# Patient Record
Sex: FEMALE | Race: WHITE | NOT HISPANIC OR LATINO | Employment: FULL TIME | URBAN - NONMETROPOLITAN AREA
[De-identification: names, ages, dates, MRNs, and addresses within clinical notes are randomized per-mention and may not be internally consistent; named-entity substitution may affect disease eponyms.]

---

## 2018-04-09 ENCOUNTER — HOSPITAL ENCOUNTER (EMERGENCY)
Facility: HOSPITAL | Age: 28
Discharge: HOME | End: 2018-04-09
Attending: NURSE PRACTITIONER | Admitting: NURSE PRACTITIONER
Payer: COMMERCIAL

## 2018-04-09 VITALS
SYSTOLIC BLOOD PRESSURE: 134 MMHG | DIASTOLIC BLOOD PRESSURE: 60 MMHG | OXYGEN SATURATION: 98 % | TEMPERATURE: 98.1 F | RESPIRATION RATE: 16 BRPM | HEART RATE: 66 BPM

## 2018-04-09 DIAGNOSIS — M26.609 TMJ (TEMPOROMANDIBULAR JOINT SYNDROME): ICD-10-CM

## 2018-04-09 PROCEDURE — G0463 HOSPITAL OUTPT CLINIC VISIT: HCPCS

## 2018-04-09 PROCEDURE — 99203 OFFICE O/P NEW LOW 30 MIN: CPT | Performed by: NURSE PRACTITIONER

## 2018-04-09 RX ORDER — CYCLOBENZAPRINE HCL 10 MG
10 TABLET ORAL 3 TIMES DAILY PRN
Qty: 20 TABLET | Refills: 0 | Status: SHIPPED | OUTPATIENT
Start: 2018-04-09 | End: 2018-05-23

## 2018-04-09 RX ORDER — IBUPROFEN 800 MG/1
800 TABLET, FILM COATED ORAL EVERY 8 HOURS PRN
Qty: 30 TABLET | Refills: 0 | Status: SHIPPED | OUTPATIENT
Start: 2018-04-09 | End: 2018-04-17

## 2018-04-09 ASSESSMENT — ENCOUNTER SYMPTOMS
CHILLS: 0
SORE THROAT: 0
FATIGUE: 0
DIARRHEA: 0
FEVER: 0
NAUSEA: 0
TROUBLE SWALLOWING: 0
ACTIVITY CHANGE: 0
APPETITE CHANGE: 0
SINUS PAIN: 0
VOMITING: 0
WEAKNESS: 0
PSYCHIATRIC NEGATIVE: 1
COUGH: 0
SINUS PRESSURE: 0
ABDOMINAL PAIN: 0
DYSURIA: 0
FACIAL SWELLING: 0
RHINORRHEA: 0

## 2018-04-09 NOTE — DISCHARGE INSTRUCTIONS
Take tylenol and/ or ibuprofen for pain.   Take Flexeril as needed as directed for jaw pain. Do not drive or participate in activities that require alertness.   Apply a warm pack to the right side of your jaw for 20 minutes every 1-2 hours while awake. Protect skin.   Follow up with dentist as scheduled.   Follow up with PCP with any increase in symptoms or concerns.   Return to urgent care or emergency department with any increase in symptoms or concerns.

## 2018-04-09 NOTE — ED PROVIDER NOTES
History     Chief Complaint   Patient presents with     Jaw Pain     The history is provided by the patient. No  was used.     Yulissa Grande is a 27 year old female who presents with right sided jaw pain that started 6 months ago. She's taken ibuprofen with mild effectiveness. She's been under stress as her  passed away and has been a single mother since. This occurred in the past year. She doesn't think she grinds her teeth, but when she awakens in the am that is when the jaw pain is at the greatest. Denies fever, chills, or night sweats. Eating and drinking well. Bowel and bladder are working well. No antibiotic use in the past 30 days.     Problem List:    There are no active problems to display for this patient.       Past Medical History:    History reviewed. No pertinent past medical history.    Past Surgical History:    No past surgical history on file.    Family History:    No family history on file.    Social History:  Marital Status:  Single [1]  Social History   Substance Use Topics     Smoking status: Not on file     Smokeless tobacco: Not on file     Alcohol use Not on file        Medications:      cyclobenzaprine (FLEXERIL) 10 MG tablet   ibuprofen (ADVIL/MOTRIN) 800 MG tablet         Review of Systems   Constitutional: Negative for activity change, appetite change, chills, fatigue and fever.   HENT: Negative for congestion, dental problem, ear discharge, ear pain, facial swelling, mouth sores, postnasal drip, rhinorrhea, sinus pain, sinus pressure, sore throat and trouble swallowing.         Right sided jaw pain with radiation to right temple and right ear.    Respiratory: Negative for cough.    Cardiovascular: Negative for chest pain.   Gastrointestinal: Negative for abdominal pain, diarrhea, nausea and vomiting.   Genitourinary: Negative for dysuria.   Skin: Negative for rash.   Neurological: Negative for dizziness, weakness and headaches.   Psychiatric/Behavioral:  Negative.        Physical Exam   BP: 134/60  Pulse: 66  Temp: 98.1  F (36.7  C)  Resp: 16  SpO2: 98 %      Physical Exam   Constitutional: She is oriented to person, place, and time. She appears well-developed and well-nourished. No distress.   HENT:   Head: Normocephalic.   Right Ear: External ear normal.   Left Ear: External ear normal.   Mouth/Throat: Oropharynx is clear and moist. No oropharyngeal exudate.   Pain is reproducible to right TMJ. No dental pain on mouth exam.    Neck: Normal range of motion. Neck supple.   Cardiovascular: Normal rate, regular rhythm and normal heart sounds.    No murmur heard.  Pulmonary/Chest: Effort normal. No respiratory distress. She has no wheezes. She has no rales.   Abdominal: Soft. She exhibits no distension.   Musculoskeletal: Normal range of motion.   Lymphadenopathy:     She has no cervical adenopathy.   Neurological: She is alert and oriented to person, place, and time. She exhibits normal muscle tone.   Skin: Skin is warm and dry. No rash noted. She is not diaphoretic.   Psychiatric: She has a normal mood and affect. Her behavior is normal.   Nursing note and vitals reviewed.      ED Course     ED Course     Procedures      Assessments & Plan (with Medical Decision Making)     Symptoms and exam are consistent with TMJ. She scheduled a dentist appointment for this week.     Discussed plan of care. She verbalized understanding. All questions answered.     I have reviewed the nursing notes.    I have reviewed the findings, diagnosis, plan and need for follow up with the patient.  Discharged in stable condition.     New Prescriptions    CYCLOBENZAPRINE (FLEXERIL) 10 MG TABLET    Take 1 tablet (10 mg) by mouth 3 times daily as needed for muscle spasms    IBUPROFEN (ADVIL/MOTRIN) 800 MG TABLET    Take 1 tablet (800 mg) by mouth every 8 hours as needed for moderate pain       Final diagnoses:   TMJ (temporomandibular joint syndrome)     Take tylenol and/ or ibuprofen for pain.    Take Flexeril as needed as directed for jaw pain. Do not drive or participate in activities that require alertness.   Apply a warm pack to the right side of your jaw for 20 minutes every 1-2 hours while awake. Protect skin.   Follow up with dentist as scheduled.   Follow up with PCP with any increase in symptoms or concerns.   Return to urgent care or emergency department with any increase in symptoms or concerns.     YVETTE Cunningham  4/9/2018  12:51 PM  URGENT CARE CLINIC       Rocio Chi NP  04/10/18 1228

## 2018-04-09 NOTE — ED AVS SNAPSHOT
[facility]    [address] [address]    Phone:  [phone]                                       Yulissa Grande   MRN: [id number]    Department:  [facility]   Date of Visit:  4/9/2018           After Visit Summary Signature Page     I have received my discharge instructions, and my questions have been answered. I have discussed any challenges I see with this plan with the nurse or doctor.    ..........................................................................................................................................  Patient/Patient Representative Signature      ..........................................................................................................................................  Patient Representative Print Name and Relationship to Patient    ..................................................               ................................................  Date                                            Time    ..........................................................................................................................................  Reviewed by Signature/Title    ...................................................              ..............................................  Date                                                            Time

## 2018-04-09 NOTE — ED AVS SNAPSHOT
[facility]    [address] [address]    Phone:  [phone]                                       Yulissa Grande   MRN: [id number]    Department:  [facility]   Date of Visit:  4/9/2018           Patient Information     Date Of Birth          1990        Your diagnoses for this visit were:     TMJ (temporomandibular joint syndrome)        You were seen by Rocio Chi NP.      Follow-up Information     Follow up with [facility].    Specialty:  EMERGENCY MEDICINE    Why:  As needed, If symptoms worsen    Contact information:    [address] [place] [address] [phone]    Additional information:    From [facility]: Take [address]. Turn left at [address]. Turn left at the first stoplight on [address]. At the first stop sign, take a right onto [address]. Take a left into the parking lot and continue through until you reach the North enterance of the building.       From [place]: Take [address]. Take the [address] ramp towards [place]. Turn left onto [address]. Take a slight right onto [address]. Turn left at the first stoplight on [address]. At the first stop sign, take a right onto [address]. Take a left into the parking lot and continue through until you reach the North enterance of the building.       From [place]: Take [address]. Take a right at [address]. At the first stop sign, take a right onto [address]. Take a left into the parking lot and continue through until you reach the North enterance of the building.         Discharge Instructions       Take tylenol and/ or ibuprofen for pain.   Take Flexeril as needed as directed for jaw pain. Do not drive or participate in activities that require alertness.   Apply a warm pack to the right side of your jaw for 20 minutes every 1-2 hours while awake. Protect skin.   Follow up  with dentist as scheduled.   Follow up with PCP with any increase in symptoms or concerns.   Return to urgent care or emergency department with any increase in symptoms or concerns.     Discharge References/Attachments     TMJ SYNDROME (ENGLISH)      Your next 10 appointments already scheduled     Apr 12, 2018 10:15 AM CDT   (Arrive by 10:00 AM)   Office Visit with Olivia Jj MD   [facility] ([facility] )    [address]  [facility] [id number] [phone]           Bring a current list of meds and any records pertaining to this visit.  For Physicals, please bring immunization records and any forms needing to be filled out.  Please arrive 15 minutes early to complete paperwork and register.                 Review of your medicines      START taking        Dose / Directions Last dose taken    cyclobenzaprine 10 MG tablet   Commonly known as:  FLEXERIL   Dose:  10 mg   Quantity:  20 tablet        Take 1 tablet (10 mg) by mouth 3 times daily as needed for muscle spasms   Refills:  0        ibuprofen 800 MG tablet   Commonly known as:  ADVIL/MOTRIN   Dose:  800 mg   Quantity:  30 tablet        Take 1 tablet (800 mg) by mouth every 8 hours as needed for moderate pain   Refills:  0                Prescriptions were sent or printed at these locations (2 Prescriptions)                   [id number] [place] [facility] [address] [address]   [address] [facility] [address], [place] [address]    Telephone:  [phone]   Fax:  [phone]   Hours:                  E-Prescribed (2 of 2)         cyclobenzaprine (FLEXERIL) 10 MG tablet               ibuprofen (ADVIL/MOTRIN) 800 MG tablet                Orders Needing Specimen Collection     None      Pending Results     No orders found from 4/7/2018 to 4/10/2018.            Pending Culture Results     No orders found from 4/7/2018 to 4/10/2018.            Thank you for choosing [facility]       Thank you for choosing [facility] for your care. Our goal is  "always to provide you with excellent care. Hearing back from our patients is one way we can continue to improve our services. Please take a few minutes to complete the written survey that you may receive in the mail after you visit with us. Thank you!        [employer] Information     [employer] lets you send messages to your doctor, view your test results, renew your prescriptions, schedule appointments and more. To sign up, go to [email or web address].[email or web address] . Click on \"Log in\" on the left side of the screen, which will take you to the Welcome page. Then click on \"Sign up Now\" on the right side of the page.     You will be asked to enter the access code listed below, as well as some personal information. Please follow the directions to create your username and password.     Your access code is: [email or web address]  Expires: 2018  1:28 PM     Your access code will  in 90 days. If you need help or a new code, please call your [place] clinic or [phone].        Care EveryWhere ID     This is your Care EveryWhere ID. This could be used by other organizations to access your [place] medical records  [id number]        Equal Access to Services     BETTE MEADOWS : Hadii luis Banks, wayolandada derek, qapepe kaalmada yarely, justin hansen. So [facility] [phone].    ATENCIÓN: Si habla español, tiene a esqueda disposición servicios gratuitos de asistencia lingüística. Alessia al [phone].    We comply with applicable federal civil rights laws and [place] laws. We do not discriminate on the basis of race, color, national origin, age, disability, sex, sexual orientation, or gender identity.            After Visit Summary       This is your record. Keep this with you and show to your community pharmacist(s) and doctor(s) at your next visit.                  "

## 2018-04-09 NOTE — ED NOTES
Pt presents today alone for c/o right sided facial pain that starts in her cheek goes up into her temple, behind her eye and then down into her neck.

## 2018-04-10 ASSESSMENT — ENCOUNTER SYMPTOMS
DIZZINESS: 0
HEADACHES: 0

## 2018-04-12 ENCOUNTER — OFFICE VISIT (OUTPATIENT)
Dept: FAMILY MEDICINE | Facility: OTHER | Age: 28
End: 2018-04-12
Attending: FAMILY MEDICINE
Payer: COMMERCIAL

## 2018-04-12 VITALS
HEIGHT: 64 IN | HEART RATE: 79 BPM | RESPIRATION RATE: 18 BRPM | DIASTOLIC BLOOD PRESSURE: 78 MMHG | BODY MASS INDEX: 34.15 KG/M2 | SYSTOLIC BLOOD PRESSURE: 120 MMHG | TEMPERATURE: 99.1 F | WEIGHT: 200 LBS | OXYGEN SATURATION: 98 %

## 2018-04-12 DIAGNOSIS — F43.21 GRIEF REACTION: ICD-10-CM

## 2018-04-12 DIAGNOSIS — Z76.89 ESTABLISHING CARE WITH NEW DOCTOR, ENCOUNTER FOR: Primary | ICD-10-CM

## 2018-04-12 PROCEDURE — G0463 HOSPITAL OUTPT CLINIC VISIT: HCPCS

## 2018-04-12 PROCEDURE — 99203 OFFICE O/P NEW LOW 30 MIN: CPT | Performed by: FAMILY MEDICINE

## 2018-04-12 ASSESSMENT — ANXIETY QUESTIONNAIRES
5. BEING SO RESTLESS THAT IT IS HARD TO SIT STILL: NOT AT ALL
3. WORRYING TOO MUCH ABOUT DIFFERENT THINGS: NOT AT ALL
4. TROUBLE RELAXING: NOT AT ALL
1. FEELING NERVOUS, ANXIOUS, OR ON EDGE: NOT AT ALL
7. FEELING AFRAID AS IF SOMETHING AWFUL MIGHT HAPPEN: NOT AT ALL
2. NOT BEING ABLE TO STOP OR CONTROL WORRYING: NOT AT ALL
GAD7 TOTAL SCORE: 0
6. BECOMING EASILY ANNOYED OR IRRITABLE: NOT AT ALL

## 2018-04-12 ASSESSMENT — PAIN SCALES - GENERAL: PAINLEVEL: NO PAIN (0)

## 2018-04-12 NOTE — NURSING NOTE
"Chief Complaint   Patient presents with     Establish Care       Initial /78 (BP Location: Right arm, Patient Position: Chair, Cuff Size: Adult Regular)  Pulse 79  Temp 99.1  F (37.3  C) (Tympanic)  Resp 18  Ht 5' 4\" (1.626 m)  Wt 200 lb (90.7 kg)  SpO2 98%  BMI 34.33 kg/m2 Estimated body mass index is 34.33 kg/(m^2) as calculated from the following:    Height as of this encounter: 5' 4\" (1.626 m).    Weight as of this encounter: 200 lb (90.7 kg).  Medication Reconciliation: complete   Abril Henry LPN      "

## 2018-04-12 NOTE — PROGRESS NOTES
SUBJECTIVE:   Yulissa Grande is a 27 year old female who presents to clinic today for the following health issues:    Establish Care     Amount of exercise or physical activity: 6-7 days/week for an average of greater than 60 minutes    Problems taking medications regularly: No    Medication side effects: none    Diet: regular (no restrictions)    Relocated back to [place] from [place].  Ajith passed away in an accident in July.  Step monica passed away from MI in 2017.  Has daughters, Gini and Valerie, ages 4 and 2.  Ajith was their father.  Patient did some counseling in [place].  Declines any further at this time.  Has support with family and friends.  Is joining [employer] in [place].  Denies substance use.  Prefers to avoid medication.      .  Uncomplicated pregnancies.  Last pap in [place] was abnormal.  Was suppose to have colposcopy, but then relocated.      Denies other health care problems.  Has had a cold past few days - cough and nasal congestion.        Problem list and histories reviewed & adjusted, as indicated.  Additional history: as documented    Current Outpatient Prescriptions   Medication     cyclobenzaprine (FLEXERIL) 10 MG tablet     ibuprofen (ADVIL/MOTRIN) 800 MG tablet     No current facility-administered medications for this visit.        There is no problem list on file for this patient.    History reviewed. No pertinent surgical history.    Social History   Substance Use Topics     Smoking status: Never Smoker     Smokeless tobacco: Never Used     Alcohol use Yes      Comment: occasionally     Family History   Problem Relation Age of Onset     Other Cancer Mother      Chronic Obstructive Pulmonary Disease Mother      Hypertension Mother      Asthma Mother      Attention Deficit Disorder Mother      Irritable Bowel Syndrome Mother      Other Cancer Father      Hypertension Father      Alcoholism Father      Thyroid Disease Maternal Grandmother      Coronary Artery Disease Maternal  "Grandfather      Prostate Cancer Maternal Grandfather            Reviewed and updated as needed this visit by clinical staff  Tobacco  Allergies  Meds  Problems  Med Hx  Surg Hx  Fam Hx  Soc Hx        Reviewed and updated as needed this visit by Provider         ROS:  Constitutional, HEENT, cardiovascular, pulmonary, gi and gu systems are negative, except as otherwise noted.    OBJECTIVE:     /78 (BP Location: Right arm, Patient Position: Chair, Cuff Size: Adult Regular)  Pulse 79  Temp 99.1  F (37.3  C) (Tympanic)  Resp 18  Ht 5' 4\" (1.626 m)  Wt 200 lb (90.7 kg)  SpO2 98%  BMI 34.33 kg/m2  Body mass index is 34.33 kg/(m^2).  GENERAL: healthy, alert and no distress  NECK: no adenopathy, no asymmetry, masses, or scars and thyroid normal to palpation  RESP: lungs clear to auscultation - no rales, rhonchi or wheezes  CV: regular rate and rhythm, normal S1 S2, no S3 or S4, no murmur, click or rub, no peripheral edema and peripheral pulses strong  ABDOMEN: soft, nontender, no hepatosplenomegaly, no masses and bowel sounds normal  MS: no gross musculoskeletal defects noted, no edema  PSYCH: mentation appears normal, affect normal/bright      ASSESSMENT/PLAN:     (Z76.89) Establishing care with new doctor, encounter for  (primary encounter diagnosis)    (F43.20) Grief reaction  Comment: support given; counseling encouraged; medication options discussed for depression if worsening.    Patient Instructions   Sign release to get records transferred.  Will then review and set up with GYN for colposcopy if needed.  See list of local counseling services below.  Call if needing assistance setting up.    Psychologists/ counselors  Marly Kelley  [phone]  [facility]     [phone]  [facility]  [phone]  [facility] [phone]  [facility]     [phone]   [facility]  [phone]  (kids)  [facility] [employer] [facility] [phone]  (teens)  [facility]   [phone]  Counseling  [facility] [phone]  [facility] [phone]  [facility] [phone]  [facility]      [phone]  [facility] [phone]  [facility]  [phone]  [facility]     [phone]   [facility] counseling [phone]  [facility]  [phone]  [facility] [phone]  Lori Davison [phone]  [facility]     [phone]  [facility] Well[facility]     [phone]  [facility]      [phone]  [facility] [phone]  [place]  Alex Almeida  [phone]  [facility]     [phone]  [facility] Dr[address] LM Mcelroy     [phone]  [facility] Psychological Services     [phone]  [facility] [phone]                        Olivia So MD  [facility]

## 2018-04-12 NOTE — MR AVS SNAPSHOT
"              After Visit Summary   4/12/2018    Yulissa Grande    MRN: [id number]           Patient Information     Date Of Birth          1990        Visit Information        Provider Department      4/12/2018 10:15 AM Olivia Jj MD [facility]        Today's Diagnoses     Establishing care with new doctor, encounter for    -  1    Grief reaction          Care Instructions    Sign release to get records transferred.  Will then review and set up with GYN for colposcopy if needed.  See list of local counseling services below.  Call if needing assistance setting up.            Follow-ups after your visit        Who to contact     If you have questions or need follow up information about today's clinic visit or your schedule please contact [facility] directly at [phone].  Normal or non-critical lab and imaging results will be communicated to you by classmarketshart, letter or phone within 4 business days after the clinic has received the results. If you do not hear from us within 7 days, please contact the clinic through [employer]art or phone. If you have a critical or abnormal lab result, we will notify you by phone as soon as possible.  Submit refill requests through [employer] or call your pharmacy and they will forward the refill request to us. Please allow 3 business days for your refill to be completed.          Additional Information About Your Visit        [email or web address] Information     [employer] lets you send messages to your doctor, view your test results, renew your prescriptions, schedule appointments and more. To sign up, go to [email or web address] . Click on \"Log in\" on the left side of the screen, which will take you to the Welcome page. Then click on \"Sign up Now\" on the right side of the page.     You will be asked to enter the access code listed below, as well as some personal information. Please follow the directions to create your username and password.     Your access code " "is: [email or web address]  Expires: 2018  1:28 PM     Your access code will  in 90 days. If you need help or a new code, please call your [place] clinic or [phone].        Care EveryWhere ID     This is your Care EveryWhere ID. This could be used by other organizations to access your [place] medical records  [id number]        Your Vitals Were     Pulse Temperature Respirations Height Pulse Oximetry BMI (Body Mass Index)    79 99.1  F (37.3  C) (Tympanic) 18 5' 4\" (1.626 m) 98% 34.33 kg/m2       Blood Pressure from Last 3 Encounters:   18 120/78   18 134/60    Weight from Last 3 Encounters:   18 200 lb (90.7 kg)              Today, you had the following     No orders found for display       Primary Care Provider Fax #    Physician No Ref-Primary [phone]       No address on file        Equal Access to Services     ANNELISE MEADOWS : Hadii luis hale hadasho Soomaali, waaxda luqadaha, qaybta kaalmada adeegyada, justin mottin duncan corcoran . So [facility] [phone].    ATENCIÓN: Si habla español, tiene a esqueda disposición servicios gratuitos de asistencia lingüística. Llame al [phone].    We comply with applicable federal civil rights laws and [place] laws. We do not discriminate on the basis of race, color, national origin, age, disability, sex, sexual orientation, or gender identity.            Thank you!     Thank you for choosing [facility] [place]  for your care. Our goal is always to provide you with excellent care. Hearing back from our patients is one way we can continue to improve our services. Please take a few minutes to complete the written survey that you may receive in the mail after your visit with us. Thank you!             Your Updated Medication List - Protect others around you: Learn how to safely use, store and throw away your medicines at www.disposemymeds.org.          This list is accurate as of 18 10:38 AM.  Always use your most recent med list.    "                Brand Name Dispense Instructions for use Diagnosis    cyclobenzaprine 10 MG tablet    FLEXERIL    20 tablet    Take 1 tablet (10 mg) by mouth 3 times daily as needed for muscle spasms        ibuprofen 800 MG tablet    ADVIL/MOTRIN    30 tablet    Take 1 tablet (800 mg) by mouth every 8 hours as needed for moderate pain

## 2018-04-12 NOTE — PATIENT INSTRUCTIONS
Sign release to get records transferred.  Will then review and set up with GYN for colposcopy if needed.  See list of local counseling services below.  Call if needing assistance setting up.    Psychologists/ counselors  [facility]  [phone]  [facility]     [phone]  Carlyle Chows  [phone]  [facility] [phone]  [facility]     [phone]   [facility]  [phone]  (kids)  [facility] [phone]  (teens)  [facility]   [phone]  Counseling  [facility] [phone]  [facility] [phone]  [facility] [phone]  [facility]      [phone]  [facility] [phone]  [facility]  [phone]  [facility]     [phone]   [facility] [phone]  [facility]  [phone]  [facility] [phone]  Lori Davison [phone]  [facility]     [phone]  [facility] Wellness     [phone]  [facility]      [phone]  [facility] [phone]  [place]  Alex Almeida  [phone]  [facility]     [phone]  [facility] [address] LM Mcel[facility]     [phone]  [facility] Psychological Services     [phone]  [facility] [phone]

## 2018-04-13 ASSESSMENT — ANXIETY QUESTIONNAIRES: GAD7 TOTAL SCORE: 0

## 2018-04-13 ASSESSMENT — PATIENT HEALTH QUESTIONNAIRE - PHQ9: SUM OF ALL RESPONSES TO PHQ QUESTIONS 1-9: 14

## 2018-04-15 ENCOUNTER — HEALTH MAINTENANCE LETTER (OUTPATIENT)
Age: 28
End: 2018-04-15

## 2018-04-16 ENCOUNTER — TELEPHONE (OUTPATIENT)
Dept: FAMILY MEDICINE | Facility: OTHER | Age: 28
End: 2018-04-16

## 2018-04-16 NOTE — TELEPHONE ENCOUNTER
Faxed POP to [facility] at [phone]    Faxed POP to [facility] at [phone]

## 2018-05-23 ENCOUNTER — OFFICE VISIT (OUTPATIENT)
Dept: FAMILY MEDICINE | Facility: OTHER | Age: 28
End: 2018-05-23
Attending: FAMILY MEDICINE
Payer: COMMERCIAL

## 2018-05-23 VITALS
BODY MASS INDEX: 34.15 KG/M2 | OXYGEN SATURATION: 98 % | DIASTOLIC BLOOD PRESSURE: 64 MMHG | WEIGHT: 200 LBS | HEIGHT: 64 IN | RESPIRATION RATE: 16 BRPM | SYSTOLIC BLOOD PRESSURE: 114 MMHG | HEART RATE: 70 BPM | TEMPERATURE: 98.8 F

## 2018-05-23 DIAGNOSIS — G89.29 CHRONIC PAIN OF RIGHT KNEE: ICD-10-CM

## 2018-05-23 DIAGNOSIS — Z30.013 ENCOUNTER FOR INITIAL PRESCRIPTION OF INJECTABLE CONTRACEPTIVE: Primary | ICD-10-CM

## 2018-05-23 DIAGNOSIS — M25.561 CHRONIC PAIN OF RIGHT KNEE: ICD-10-CM

## 2018-05-23 DIAGNOSIS — M25.571 PAIN IN JOINT, ANKLE AND FOOT, RIGHT: ICD-10-CM

## 2018-05-23 LAB — HCG UR QL: NEGATIVE

## 2018-05-23 PROCEDURE — G0463 HOSPITAL OUTPT CLINIC VISIT: HCPCS | Mod: 25

## 2018-05-23 PROCEDURE — 81025 URINE PREGNANCY TEST: CPT | Mod: ZL | Performed by: FAMILY MEDICINE

## 2018-05-23 PROCEDURE — G0463 HOSPITAL OUTPT CLINIC VISIT: HCPCS

## 2018-05-23 PROCEDURE — 96372 THER/PROPH/DIAG INJ SC/IM: CPT

## 2018-05-23 PROCEDURE — 99214 OFFICE O/P EST MOD 30 MIN: CPT | Performed by: FAMILY MEDICINE

## 2018-05-23 RX ORDER — MEDROXYPROGESTERONE ACETATE 150 MG/ML
150 INJECTION, SUSPENSION INTRAMUSCULAR
Qty: 1 ML | Refills: 3 | OUTPATIENT
Start: 2018-05-23 | End: 2023-04-20

## 2018-05-23 RX ORDER — IBUPROFEN 800 MG/1
TABLET, FILM COATED ORAL
COMMUNITY
Start: 2018-05-21 | End: 2018-07-21

## 2018-05-23 RX ORDER — HYDROCODONE BITARTRATE AND ACETAMINOPHEN 5; 325 MG/1; MG/1
TABLET ORAL
COMMUNITY
Start: 2018-05-21 | End: 2018-07-21

## 2018-05-23 ASSESSMENT — ANXIETY QUESTIONNAIRES
3. WORRYING TOO MUCH ABOUT DIFFERENT THINGS: NOT AT ALL
GAD7 TOTAL SCORE: 0
5. BEING SO RESTLESS THAT IT IS HARD TO SIT STILL: NOT AT ALL
4. TROUBLE RELAXING: NOT AT ALL
7. FEELING AFRAID AS IF SOMETHING AWFUL MIGHT HAPPEN: NOT AT ALL
2. NOT BEING ABLE TO STOP OR CONTROL WORRYING: NOT AT ALL
1. FEELING NERVOUS, ANXIOUS, OR ON EDGE: NOT AT ALL
6. BECOMING EASILY ANNOYED OR IRRITABLE: NOT AT ALL

## 2018-05-23 ASSESSMENT — PAIN SCALES - GENERAL: PAINLEVEL: NO PAIN (0)

## 2018-05-23 NOTE — PROGRESS NOTES
SUBJECTIVE:                                                    Yulissa Grande is a 28 year old female who presents to clinic today for the following health issues:        Birth Control      Duration: Is currently on the pill but can't remember to take them.      Description (location/character/radiation): Would like to know and learn the difference between the depo shot and the pills.    Intensity:  mild    Accompanying signs and symptoms: n/a    History (similar episodes/previous evaluation): None    Precipitating or alleviating factors: None    Therapies tried and outcome: None    Menses regular, normal flow    Forgets OCP majority of time, so has been off now for few months    Declines std testing         Musculoskeletal problem/pain      Duration: years    Description  Location: Right knee and ankle    Intensity:  moderate    Accompanying signs and symptoms: none    History  Previous similar problem: YES  Previous evaluation:  x-ray and MRI    Precipitating or alleviating factors:  Trauma or overuse: YES- fell playing basketball in school  Aggravating factors include: running-right ankle likes to turn inward    Therapies tried and outcome: Did see ortho but chose to not do any surgeries.  Is looking for knee brace, ankle brace and possibly ortho referral.    Injury was as a teen; issue with patellar tracking    Pain only bothers her with running and is trying to be active    Wants to try brace first before moving on to ortho consult again    No locking, catching, swelling      Problem list and histories reviewed & adjusted, as indicated.  Additional history: as documented    Current Outpatient Prescriptions   Medication     HYDROcodone-acetaminophen (NORCO) 5-325 MG per tablet     ibuprofen (ADVIL/MOTRIN) 800 MG tablet     medroxyPROGESTERone (DEPO-PROVERA) 150 MG/ML injection     order for DME     No current facility-administered medications for this visit.        There is no problem list on file for this  "patient.    History reviewed. No pertinent surgical history.    Social History   Substance Use Topics     Smoking status: Never Smoker     Smokeless tobacco: Never Used     Alcohol use Yes      Comment: occasionally     Family History   Problem Relation Age of Onset     Other Cancer Mother      Chronic Obstructive Pulmonary Disease Mother      Hypertension Mother      Asthma Mother      Attention Deficit Disorder Mother      Irritable Bowel Syndrome Mother      Other Cancer Father      Hypertension Father      Alcoholism Father      Thyroid Disease Maternal Grandmother      Coronary Artery Disease Maternal Grandfather      Prostate Cancer Maternal Grandfather            ROS:  CONSTITUTIONAL:NEGATIVE for fever, chills, change in weight  INTEGUMENTARY/SKIN: NEGATIVE for worrisome rashes, moles or lesions  RESP:NEGATIVE for significant cough or SOB  CV: NEGATIVE for chest pain, palpitations or peripheral edema  GI: NEGATIVE for nausea, abdominal pain, heartburn, or change in bowel habits  : normal menstrual cycles  MUSCULOSKELETAL: POSITIVE  for arthralgias right knee and ankle  NEURO: NEGATIVE for weakness, dizziness or paresthesias    OBJECTIVE:     /64 (BP Location: Left arm, Patient Position: Sitting, Cuff Size: Adult Large)  Pulse 70  Temp 98.8  F (37.1  C) (Tympanic)  Resp 16  Ht 5' 4\" (1.626 m)  Wt 200 lb (90.7 kg)  LMP 05/01/2018  SpO2 98%  BMI 34.33 kg/m2  Body mass index is 34.33 kg/(m^2).  GENERAL: healthy, alert and no distress  NECK: no adenopathy, no asymmetry, masses, or scars and thyroid normal to palpation  RESP: lungs clear to auscultation - no rales, rhonchi or wheezes  CV: regular rate and rhythm, normal S1 S2, no S3 or S4, no murmur, click or rub, no peripheral edema and peripheral pulses strong  ABDOMEN: soft, nontender, no hepatosplenomegaly, no masses and bowel sounds normal  MS: normal muscle tone, normal range of motion and both ankle and knee appear stable; no effusion; mild " generalized tenderness; patella visually seems to track  SKIN: no suspicious lesions or rashes  NEURO: Normal strength and tone, mentation intact and speech normal  PSYCH: mentation appears normal, affect normal/bright    Diagnostic Test Results:  Results for orders placed or performed in visit on 05/23/18 (from the past 24 hour(s))   HCG Qual, Urine - CSC,  Range, Honorio  ([id number])   Result Value Ref Range    HCG Qual Urine Negative NEG^Negative       ASSESSMENT/PLAN:   (Z30.013) Encounter for initial prescription of injectable contraceptive  (primary encounter diagnosis)  Comment: all contraceptive options discussed  Plan: HCG Qual, Urine - CSC,  Range, [place]          ([id number]), medroxyPROGESTERone (DEPO-PROVERA)         150 MG/ML injection, C Medroxyprogesterone         inj/1mg, INJECTION INTRAMUSCULAR OR SUB-Q          (M25.561,  G89.29) Chronic pain of right knee  Comment: prior patellar abnormal tracking; injury  Plan: order for DME          (M25.571) Pain in joint, ankle and foot, right  Comment: secondary  Plan: order for DME            Patient Instructions   Urine pregnancy test negative today.  Start Depo Provera.  Schedule follow up visit in shot room for next injection.  Condom use advised along with routine std testing.    Script for knee and ankle brace sent to [facility].  Pulling old chart to review ortho and MRI records.  Call if desiring repeat orthopedic consultation.  May need updated imaging.            Olivia So MD  [facility]

## 2018-05-23 NOTE — NURSING NOTE
"Chief Complaint   Patient presents with     Contraception       Initial /64 (BP Location: Left arm, Patient Position: Sitting, Cuff Size: Adult Large)  Pulse 70  Temp 98.8  F (37.1  C) (Tympanic)  Resp 16  Ht 5' 4\" (1.626 m)  Wt 200 lb (90.7 kg)  LMP 05/01/2018  SpO2 98%  BMI 34.33 kg/m2 Estimated body mass index is 34.33 kg/(m^2) as calculated from the following:    Height as of this encounter: 5' 4\" (1.626 m).    Weight as of this encounter: 200 lb (90.7 kg).  Medication Reconciliation: complete       Annie Woods LPN    "

## 2018-05-23 NOTE — MR AVS SNAPSHOT
After Visit Summary   5/23/2018    Yulissa Grande    MRN: [id number]           Patient Information     Date Of Birth          1990        Visit Information        Provider Department      5/23/2018 1:45 PM [facility] Jj MD [facility]        Today's Diagnoses     Encounter for initial prescription of injectable contraceptive    -  1    Chronic pain of right knee        Pain in joint, ankle and foot, right          Care Instructions    Urine pregnancy test negative today.  Start Depo Provera.  Schedule follow up visit in shot room for next injection.  Condom use advised along with routine std testing.    Script for knee and ankle brace sent to [facility].  Pulling old chart to review ortho and MRI records.  Call if desiring repeat orthopedic consultation.  May need updated imaging.              Follow-ups after your visit        Future tests that were ordered for you today     Open Standing Orders        Priority Remaining Interval Expires Ordered    C Medroxyprogesterone inj/1mg Routine 4/4 5/23/2019 5/23/2018    INJECTION INTRAMUSCULAR OR SUB-Q Routine 4/4 5/23/2019 5/23/2018            Who to contact     If you have questions or need follow up information about today's clinic visit or your schedule please contact [facility] directly at [phone].  Normal or non-critical lab and imaging results will be communicated to you by MyChart, letter or phone within 4 business days after the clinic has received the results. If you do not hear from us within 7 days, please contact the clinic through MyChart or phone. If you have a critical or abnormal lab result, we will notify you by phone as soon as possible.  Submit refill requests through [employer] or call your pharmacy and they will forward the refill request to us. Please allow 3 business days for your refill to be completed.          Additional Information About Your Visit        Care EveryWhere ID     This is your  "Care EveryWhere ID. This could be used by other organizations to access your [employer] medical records  [id number]        Your Vitals Were     Pulse Temperature Respirations Height Last Period Pulse Oximetry    70 98.8  F (37.1  C) (Tympanic) 16 5' 4\" (1.626 m) 05/01/2018 98%    BMI (Body Mass Index)                   34.33 kg/m2            Blood Pressure from Last 3 Encounters:   05/23/18 114/64   04/12/18 120/78   04/09/18 134/60    Weight from Last 3 Encounters:   05/23/18 200 lb (90.7 kg)   04/12/18 200 lb (90.7 kg)              We Performed the Following     HCG Qual, Urine - ALEJANDRO,  Honorio Hardy  ([id number])          Today's Medication Changes          These changes are accurate as of 5/23/18  2:23 PM.  If you have any questions, ask your nurse or doctor.               Start taking these medicines.        Dose/Directions    medroxyPROGESTERone 150 MG/ML injection   Commonly known as:  DEPO-PROVERA   Used for:  Encounter for initial prescription of injectable contraceptive   Started by:  Olivia Jj MD        Dose:  150 mg   Inject 1 mL (150 mg) into the muscle every 3 months   Quantity:  1 mL   Refills:  3       order for DME   Used for:  Chronic pain of right knee, Pain in joint, ankle and foot, right   Started by:  Olivia Jj MD        Equipment being ordered: right ASO brace for ankle; right knee brace patellar stabilization brace   Quantity:  1 Units   Refills:  0         Stop taking these medicines if you haven't already. Please contact your care team if you have questions.     cyclobenzaprine 10 MG tablet   Commonly known as:  FLEXERIL   Stopped by:  Olivia Jj MD                Where to get your medicines      Some of these will need a paper prescription and others can be bought over the counter.  Ask your nurse if you have questions.     Bring a paper prescription for each of these medications     order for DME       You don't need a prescription for these medications     " medroxyPROGESTERone 150 MG/ML injection               Information about OPIOIDS     PRESCRIPTION OPIOIDS: WHAT YOU NEED TO KNOW   You have a prescription for an opioid (narcotic) pain medicine. Opioids can cause addiction. If you have a history of chemical dependency of any type, you are at a higher risk of becoming addicted to opioids. Only take this medicine after all other options have been tried. Take it for as short a time and as few doses as possible.     Do not:    Drive. If you drive while taking these medicines, you could be arrested for driving under the influence (DUI).    Operate heavy machinery    Do any other dangerous activities while taking these medicines.     Drink any alcohol while taking these medicines.      Take with any other medicines that contain acetaminophen. Read all labels carefully. Look for the word  acetaminophen  or  Tylenol.  Ask your pharmacist if you have questions or are unsure.    Store your pills in a secure place, locked if possible. We will not replace any lost or stolen medicine. If you don t finish your medicine, please throw away (dispose) as directed by your pharmacist. The [place] Pollution [employer] has more information about safe disposal: https://www.pca.[facility].[place].us/living-green/managing-unwanted-medications    All opioids tend to cause constipation. Drink plenty of water and eat foods that have a lot of fiber, such as fruits, vegetables, prune juice, apple juice and high-fiber cereal. Take a laxative (Miralax, milk of magnesia, Colace, Senna) if you don t move your bowels at least every other day.          Primary Care Provider Office Phone # Fax #    Olivia Jj [phone] [address] [place] [address]        Equal Access to Services     [facility]: Hadnydia Banks, waaxda luqantonino, qaybta justin gutierrez. [facility] [phone].    ATENCIÓN: Bridger pérez,  tiene a esqueda disposición servicios gratuitos de asistencia lingüística. Alessia meehan [phone].    We comply with applicable federal civil rights laws and [place] laws. We do not discriminate on the basis of race, color, national origin, age, disability, sex, sexual orientation, or gender identity.            Thank you!     [facility]  [facility] your [facility]. Our goal is always to provide you with excellent care. Hearing back from our patients is one way we can continue to improve our services. Please take a few minutes to complete the written survey that you may receive in the mail after your visit with us. Thank you!             Your Updated Medication List - Protect others around you: Learn how to safely use, store and throw away your medicines at www.disposemymeds.org.          This list is accurate as of 5/23/18  2:23 PM.  Always use your most recent med list.                   Brand Name Dispense Instructions for use Diagnosis    HYDROcodone-acetaminophen 5-325 MG per tablet    NORCO          ibuprofen 800 MG tablet    ADVIL/MOTRIN          medroxyPROGESTERone 150 MG/ML injection    DEPO-PROVERA    1 mL    Inject 1 mL (150 mg) into the muscle every 3 months    Encounter for initial prescription of injectable contraceptive       order for DME     1 Units    Equipment being ordered: right ASO brace for ankle; right knee brace patellar stabilization brace    Chronic pain of right knee, Pain in joint, ankle and foot, right

## 2018-05-23 NOTE — PATIENT INSTRUCTIONS
Urine pregnancy test negative today.  Start Depo Provera.  Schedule follow up visit in shot room for next injection.  Condom use advised along with routine std testing.    Script for knee and ankle brace sent to [facility].  Pulling old chart to review ortho and MRI records.  Call if desiring repeat orthopedic consultation.  May need updated imaging.

## 2018-05-24 ASSESSMENT — PATIENT HEALTH QUESTIONNAIRE - PHQ9: SUM OF ALL RESPONSES TO PHQ QUESTIONS 1-9: 1

## 2018-05-24 ASSESSMENT — ANXIETY QUESTIONNAIRES: GAD7 TOTAL SCORE: 0

## 2018-07-21 ENCOUNTER — HOSPITAL ENCOUNTER (EMERGENCY)
Facility: HOSPITAL | Age: 28
Discharge: HOME | End: 2018-07-21
Attending: PHYSICIAN ASSISTANT | Admitting: PHYSICIAN ASSISTANT
Payer: COMMERCIAL

## 2018-07-21 ENCOUNTER — APPOINTMENT (OUTPATIENT)
Dept: GENERAL RADIOLOGY | Facility: HOSPITAL | Age: 28
End: 2018-07-21
Attending: PHYSICIAN ASSISTANT
Payer: COMMERCIAL

## 2018-07-21 VITALS
SYSTOLIC BLOOD PRESSURE: 142 MMHG | RESPIRATION RATE: 14 BRPM | OXYGEN SATURATION: 100 % | DIASTOLIC BLOOD PRESSURE: 83 MMHG | TEMPERATURE: 98.9 F

## 2018-07-21 DIAGNOSIS — S93.402A SPRAIN OF LEFT ANKLE, UNSPECIFIED LIGAMENT, INITIAL ENCOUNTER: ICD-10-CM

## 2018-07-21 PROCEDURE — 99213 OFFICE O/P EST LOW 20 MIN: CPT | Performed by: PHYSICIAN ASSISTANT

## 2018-07-21 PROCEDURE — 73610 X-RAY EXAM OF ANKLE: CPT | Mod: TC,LT

## 2018-07-21 PROCEDURE — G0463 HOSPITAL OUTPT CLINIC VISIT: HCPCS

## 2018-07-21 RX ORDER — IBUPROFEN 800 MG/1
800 TABLET, FILM COATED ORAL EVERY 8 HOURS PRN
Qty: 60 TABLET | Refills: 0 | Status: SHIPPED | OUTPATIENT
Start: 2018-07-21 | End: 2018-07-24

## 2018-07-21 ASSESSMENT — ENCOUNTER SYMPTOMS
CARDIOVASCULAR NEGATIVE: 1
JOINT SWELLING: 1
CONSTITUTIONAL NEGATIVE: 1
PSYCHIATRIC NEGATIVE: 1
ARTHRALGIAS: 1
RESPIRATORY NEGATIVE: 1

## 2018-07-21 NOTE — DISCHARGE INSTRUCTIONS
- Rest, ice, compression, elevation  - Crutches and NO weight bearing for 2 additional days, may try weight bearing after that, but if it hurts: get back on the crutches and follow up with primary in 7-10 days to recheck (may re-xray at that time, but no point in doing it sooner)  - Rotate ibuprofen and tylenol every 3-6 hrs for 2-3 days  - Topicals: Arnica Cream (5$ at [employer]) and/or Capsaicin. (1/4 teaspoon cayenne pepper in a palmful olive oil and rub in 2-3 times daily for 5-7 days for pain)

## 2018-07-21 NOTE — ED AVS SNAPSHOT
[facility]    [address] [place] [address]    Phone:  [phone]                                       Yulissa Grande   MRN: [id number]    Department:  [facility]   Date of Visit:  7/21/2018           Patient Information     Date Of Birth          1990        Your diagnoses for this visit were:     Sprain of left ankle, unspecified ligament, initial encounter        You were seen by Maynor Drake PA.      Follow-up Information     Follow up with Olivia Jj MD In 1 week.    Specialty:  Family Practice    Contact information:    [address] [place] [address] [phone]          Follow up with [facility].    Specialty:  EMERGENCY MEDICINE    Why:  If symptoms worsen    Contact information:    [address] [place] [address] [phone]    Additional information:    From [facility]: Take [address]. Turn left at [address]. Turn left at the first stoplight on [address] [place] [address]. At the first stop sign, take a right onto [address]. Take a left into the parking lot and continue through until you reach the North enterance of the building.       From [place]: Take [address]. Take the [address] ramp towards [place]. Turn left onto [address]. Take a slight right onto [address]. Turn left at the first stoplight on [address]. At the first stop sign, take a right onto [address]. Take a left into the parking lot and continue through until you reach the North enterance of the building.       From [place]: Take [address]. Take a right at [address]. At the first stop sign, take a right onto [address]. Take a left into the parking lot and continue through until you reach the North enterance of the building.         Discharge Instructions       - Rest, ice, compression, elevation  - Crutches and NO weight bearing for 2 additional days, may try  weight bearing after that, but if it hurts: get back on the crutches and follow up with primary in 7-10 days to recheck (may re-xray at that time, but no point in doing it sooner)  - Rotate ibuprofen and tylenol every 3-6 hrs for 2-3 days  - Topicals: Arnica Cream (5$ at [employer]) and/or Capsaicin. (1/4 teaspoon cayenne pepper in a palmful olive oil and rub in 2-3 times daily for 5-7 days for pain)      Discharge References/Attachments     SPRAIN, ANKLE (ADULT) (ENGLISH)      Your next 10 appointments already scheduled     Aug 15, 2018  1:15 PM CDT   immunization with HC SHOT ROOM   [facility] ([facility] )    [address]  [place] [address]   [phone]                 Review of your medicines      Our records show that you are taking the medicines listed below. If these are incorrect, please call your family doctor or clinic.        Dose / Directions Last dose taken    ibuprofen 800 MG tablet   Commonly known as:  ADVIL/MOTRIN        Refills:  0        medroxyPROGESTERone 150 MG/ML injection   Commonly known as:  DEPO-PROVERA   Dose:  150 mg   Quantity:  1 mL        Inject 1 mL (150 mg) into the muscle every 3 months   Refills:  3        order for DME   Quantity:  1 Units        Equipment being ordered: right ASO brace for ankle; right knee brace patellar stabilization brace   Refills:  0                Procedures and tests performed during your visit     XR Ankle Left G/E 3 Views      Orders Needing Specimen Collection     None      Pending Results     Date and Time Order Name Status Description    7/21/2018 1214 XR Ankle Left G/E 3 Views In process             Pending Culture Results     No orders found from 7/19/2018 to 7/22/2018.            Thank you for choosing [facility]       Thank you for choosing [facility] for your care. Our goal is always to provide you with excellent care. Hearing back from our patients is one way we can continue to improve our services. Please  take a few minutes to complete the written survey that you may receive in the mail after you visit with us. Thank you!        Care EveryWhere ID     This is your Care EveryWhere ID. This could be used by other organizations to access your [place] medical records  [id number]        Equal Access to Services     [facility]ETTE MEADOWS : Shaina Banks, jania reed, qapepe kaalcameron pritchett, justin hansen. So [facility] [phone].    ATENCIÓN: Si habla español, tiene a esqueda disposición servicios gratuitos de asistencia lingüística. Llame al [phone].    We comply with applicable federal civil rights laws and [place] laws. We do not discriminate on the basis of race, color, national origin, age, disability, sex, sexual orientation, or gender identity.            After Visit Summary       This is your record. Keep this with you and show to your community pharmacist(s) and doctor(s) at your next visit.

## 2018-07-21 NOTE — ED PROVIDER NOTES
History     Chief Complaint   Patient presents with     Foot Pain     lt foot pain since yesterday, notes missed a step and fell on her knees on the grass     HPI  Yulissa Grande is a 28 year old female who presents to urgent care for left ankle pain.   DEL: mis-stepped taking the last stair to the yard.Unsure of how she twistedher ankle.   Pain is described as achy to sharp, made worse with weight bearing, and is located left lateral ankle.  Treatments tried thus far include ice and ibuprofen.   Patient denies any additional injury.   Problem List:    There are no active problems to display for this patient.       Past Medical History:    No past medical history on file.    Past Surgical History:    No past surgical history on file.    Family History:    Family History   Problem Relation Age of Onset     Other Cancer Mother      Chronic Obstructive Pulmonary Disease Mother      Hypertension Mother      Asthma Mother      Attention Deficit Disorder Mother      Irritable Bowel Syndrome Mother      Other Cancer Father      Hypertension Father      Alcoholism Father      Thyroid Disease Maternal Grandmother      Coronary Artery Disease Maternal Grandfather      Prostate Cancer Maternal Grandfather        Social History:  Marital Status:  Single [1]  Social History   Substance Use Topics     Smoking status: Never Smoker     Smokeless tobacco: Never Used     Alcohol use Yes      Comment: occasionally        Medications:      ibuprofen (ADVIL/MOTRIN) 800 MG tablet   medroxyPROGESTERone (DEPO-PROVERA) 150 MG/ML injection   order for DME         Review of Systems   Constitutional: Negative.    Respiratory: Negative.    Cardiovascular: Negative.    Musculoskeletal: Positive for arthralgias, gait problem and joint swelling.   Psychiatric/Behavioral: Negative.        Physical Exam   BP: 142/83  Heart Rate: 99  Temp: 98.9  F (37.2  C)  Resp: 14  SpO2: 100 %      Physical Exam   Constitutional: She is oriented to person, place,  and time. She appears well-developed and well-nourished. No distress.   Eyes: Conjunctivae are normal.   Cardiovascular: Normal rate.    Pulmonary/Chest: Effort normal.   Musculoskeletal:        Left knee: Normal.        Left ankle: She exhibits decreased range of motion (pain limits deep flexion/extension), swelling and ecchymosis. Tenderness. Lateral malleolus tenderness found. Achilles tendon normal.        Left lower leg: Normal.        Left foot: There is decreased range of motion (pain into the ankle with wiggling toes, but can) and swelling. There is no tenderness.        Feet:    Neurological: She is alert and oriented to person, place, and time.   Skin: Skin is warm and dry.   Psychiatric: She has a normal mood and affect.   Nursing note and vitals reviewed.      ED Course     ED Course     Procedures      Assessments & Plan (with Medical Decision Making)     I have reviewed the nursing notes.  I have reviewed the findings, diagnosis, plan and need for follow up with the patient.    Discharge Medication List as of 7/21/2018 12:31 PM          Final diagnoses:   Sprain of left ankle, unspecified ligament, initial encounter   XR negative for Fx. Stirrup applied and will use crutches for 3-7 days. RICE and rotate ibu/tylenol for pain. Follow up with Primary Care Provider in 7-10 days with poor improvement. Seek attention sooner with worsening despite treatment. Patient verbally educated and given appropriate education sheets for each of the diagnoses and has no questions.    Maynor Drake PA-C   7/21/2018   3:38 PM      7/21/2018   [facility]     Maynor Drake PA  07/21/18 7949

## 2018-07-21 NOTE — ED AVS SNAPSHOT
[facility]    [address] [address]    Phone:  [phone]                                       Yulissa Grande   MRN: [id number]    Department:  [facility]   Date of Visit:  7/21/2018           After Visit Summary Signature Page     I have received my discharge instructions, and my questions have been answered. I have discussed any challenges I see with this plan with the nurse or doctor.    ..........................................................................................................................................  Patient/Patient Representative Signature      ..........................................................................................................................................  Patient Representative Print Name and Relationship to Patient    ..................................................               ................................................  Date                                            Time    ..........................................................................................................................................  Reviewed by Signature/Title    ...................................................              ..............................................  Date                                                            Time

## 2018-07-21 NOTE — ED TRIAGE NOTES
Pt presents with pain and swelling to left ankle and left foot that happened last night when she thinks that she may have missed a step and landed on her knees and twisted her left foot. Describes the pain as stabbing and rates the pain at 6/10.

## 2018-07-23 ENCOUNTER — TELEPHONE (OUTPATIENT)
Dept: FAMILY MEDICINE | Facility: OTHER | Age: 28
End: 2018-07-23

## 2018-07-23 NOTE — TELEPHONE ENCOUNTER
7:47 AM    Reason for Call: Phone Call    Description:  PTrequesting ER follow up from 07/21 asap.  Pt requesting MRI of her foot as well.  PCP is out.    Was an appointment offered for this call? Yes   If yes : Appointment type              Date    Preferred method for responding to this message: Telephone Call  What is your phone number ?[phone]    If we cannot reach you directly, may we leave a detailed response at the number you provided? Yes    Can this message wait until your PCP/provider returns, if available today? No, provider is out    Mary Justice

## 2018-07-24 ENCOUNTER — APPOINTMENT (OUTPATIENT)
Dept: GENERAL RADIOLOGY | Facility: HOSPITAL | Age: 28
End: 2018-07-24
Attending: NURSE PRACTITIONER
Payer: COMMERCIAL

## 2018-07-24 ENCOUNTER — HOSPITAL ENCOUNTER (EMERGENCY)
Facility: HOSPITAL | Age: 28
Discharge: HOME | End: 2018-07-24
Attending: NURSE PRACTITIONER | Admitting: NURSE PRACTITIONER
Payer: COMMERCIAL

## 2018-07-24 VITALS
SYSTOLIC BLOOD PRESSURE: 147 MMHG | OXYGEN SATURATION: 98 % | HEART RATE: 90 BPM | RESPIRATION RATE: 16 BRPM | TEMPERATURE: 98.2 F | DIASTOLIC BLOOD PRESSURE: 61 MMHG

## 2018-07-24 DIAGNOSIS — S93.602S FOOT SPRAIN, LEFT, SEQUELA: ICD-10-CM

## 2018-07-24 DIAGNOSIS — S93.402A SPRAIN OF LEFT ANKLE, UNSPECIFIED LIGAMENT, INITIAL ENCOUNTER: ICD-10-CM

## 2018-07-24 PROCEDURE — 73630 X-RAY EXAM OF FOOT: CPT | Mod: TC,LT,FY

## 2018-07-24 PROCEDURE — 99213 OFFICE O/P EST LOW 20 MIN: CPT | Performed by: NURSE PRACTITIONER

## 2018-07-24 PROCEDURE — G0463 HOSPITAL OUTPT CLINIC VISIT: HCPCS

## 2018-07-24 RX ORDER — NAPROXEN 500 MG/1
500 TABLET ORAL
Qty: 24 TABLET | Refills: 0 | Status: SHIPPED | OUTPATIENT
Start: 2018-07-24 | End: 2018-08-01

## 2018-07-24 RX ORDER — TRAMADOL HYDROCHLORIDE 50 MG/1
50-100 TABLET ORAL EVERY 6 HOURS PRN
Qty: 15 TABLET | Refills: 0 | Status: SHIPPED | OUTPATIENT
Start: 2018-07-24 | End: 2018-08-02

## 2018-07-24 ASSESSMENT — ENCOUNTER SYMPTOMS
FATIGUE: 0
MYALGIAS: 1
FEVER: 0
COLOR CHANGE: 1
APPETITE CHANGE: 0
CHILLS: 0
ARTHRALGIAS: 1
JOINT SWELLING: 1

## 2018-07-24 NOTE — ED PROVIDER NOTES
History     Chief Complaint   Patient presents with     Foot Pain     The history is provided by the patient. No  was used.     Yulissa Grande is a 28 year old female who presents today for a re-evaluation of a left foot and ankle injury.  She was originally seen on 7/21 for a left ankle injury.  She had a negative x-ray of her left ankle.  She has since started with pain and swelling in her foot.  She has been taking ibuprofen 800 mg along with tylenol without much improvement.  She has also been icing and elevating almost constantly.  She is using ACE wrap, ankle stirrup and crutches.  No numbness or tingling.  She is requesting a referral to Orthopedics    Problem List:    There are no active problems to display for this patient.       Past Medical History:    No past medical history on file.    Past Surgical History:    No past surgical history on file.    Family History:    Family History   Problem Relation Age of Onset     Other Cancer Mother      Chronic Obstructive Pulmonary Disease Mother      Hypertension Mother      Asthma Mother      Attention Deficit Disorder Mother      Irritable Bowel Syndrome Mother      Other Cancer Father      Hypertension Father      Alcoholism Father      Thyroid Disease Maternal Grandmother      Coronary Artery Disease Maternal Grandfather      Prostate Cancer Maternal Grandfather        Social History:  Marital Status:  Single [1]  Social History   Substance Use Topics     Smoking status: Never Smoker     Smokeless tobacco: Never Used     Alcohol use Yes      Comment: occasionally        Medications:      medroxyPROGESTERone (DEPO-PROVERA) 150 MG/ML injection   naproxen (NAPROSYN) 500 MG tablet   traMADol (ULTRAM) 50 MG tablet   order for DME         Review of Systems   Constitutional: Negative for appetite change, chills, fatigue and fever.   Musculoskeletal: Positive for arthralgias, joint swelling and myalgias.   Skin: Positive for color change.        Physical Exam   BP: 147/61  Pulse: 90  Temp: 98.2  F (36.8  C)  Resp: 16  SpO2: 98 %      Physical Exam   Constitutional: She appears well-developed. She is cooperative. She does not appear ill.   HENT:   Head: Normocephalic and atraumatic.   Cardiovascular: Normal rate.    Pulmonary/Chest: Effort normal.   Musculoskeletal:        Left ankle: She exhibits decreased range of motion, swelling and ecchymosis. She exhibits no deformity, no laceration and normal pulse. Tenderness. Lateral malleolus, medial malleolus and head of 5th metatarsal tenderness found.        Left foot: There is decreased range of motion, bony tenderness (distal tarsals dorsal surface of foot) and swelling (generalized over distal foot and lateral ankle). There is normal capillary refill, no crepitus, no deformity and no laceration.   Neurological: She is alert.   Nursing note and vitals reviewed.      ED Course     ED Course     Procedures    Results for orders placed or performed during the hospital encounter of 07/24/18   Foot XR, G/E 3 views, left    Narrative    Exam: XR FOOT LT G/E 3 VW     History:Female, age 28 years, left foot pain, fall 3 days ago;     Comparison:  None    Technique: Three views are submitted.    Findings: Bones are normally mineralized. No evidence of acute or  subacute fracture.  No evidence of dislocation.           Impression    Impression:  1.  No evidence of acute or subacute bony abnormality.    COY SCHAEFFER MD       Assessments & Plan (with Medical Decision Making)     I have reviewed the nursing notes.    I have reviewed the findings, diagnosis, plan and need for follow up with the patient.  ASSESSMENT / PLAN:  (S93.402A) Sprain of left ankle, unspecified ligament, initial encounter  Comment: with significant swelling and ecchymosis, n/v intact  Plan:  Rest, ice 20 minutes at least 4 times daily for the first 48 hours, then ice and/or heat as needed for symptomatic relief   Elevate affected area as  much as possible   OTC Motrin and or Tylenol as needed for pain relief   Tramadol as needed for breakthrough pain, no refills will be given in Urgent Care   ACE and stirrup splint for comfort/support, crutches for partial weight bearing   Appointment was made with Dr Lockhart for Monday 7/30    (P83.787E) Foot sprain, left, sequela  Comment: x-ray negative for fracture or dislocation, n/v intact  Plan:   See above      Discharge Medication List as of 7/24/2018  2:55 PM      START taking these medications    Details   naproxen (NAPROSYN) 500 MG tablet Take 1 tablet (500 mg) by mouth 3 times daily (with meals) for 8 days, Disp-24 tablet, R-0, E-Prescribe      traMADol (ULTRAM) 50 MG tablet Take 1-2 tablets ( mg) by mouth every 6 hours as needed for breakthrough pain or severe pain, Disp-15 tablet, R-0, Local Print             Final diagnoses:   Sprain of left ankle, unspecified ligament, initial encounter   Foot sprain, left, sequela       7/24/2018   [facility]     Annie Jaramillo NP  07/24/18 0469

## 2018-07-24 NOTE — ED AVS SNAPSHOT
[facility]    [address]    [place] [address]    Phone:  [phone]                                       Yulissa Grande   MRN: [id number]    Department[facility]  [facility]   Date of Visit:  7/24/2018           Patient Information     Date Of Birth          1990        Your diagnoses for this visit were:     Sprain of left ankle, unspecified ligament, initial encounter     Foot sprain, left, sequela        You were seen by Annie Jaramillo NP.      Follow-up Information     Follow up with Hany Lockhart DPM On 7/30/2018.    Specialty:  Podiatry    Why:  [place] location by [facility] at 10:45 - I will send your x-rays to them    Contact information:    [address]  [place] [address]  [phone]          Discharge Instructions         Treating Ankle Sprains  Treatment will depend on how bad your sprain is. For a severe sprain, healing may take 3 months or more.  Right after your injury: Use R.I.C.E.    Rest: At first, keep weight off the ankle as much as you can. You may be given crutches to help you walk without putting weight on the ankle.    Ice: Put an ice pack on the ankle for 20 minutes. Remove the pack and wait at least 30 minutes. Repeat for up to 3 days. This helps reduce swelling.    Compression: To reduce swelling and keep the joint stable, you may need to wrap the ankle with an elastic bandage. For more severe sprains, you may need an ankle brace, a boot, or a cast.    Elevation: To reduce swelling, keep your ankle raised above your heart when you sit or lie down.  Medicine  Your healthcare provider may suggest oral nonsteroidal anti-inflammatory medicine (NSAIDs), such as ibuprofen. This relieves the pain and helps reduce swelling. Be sure to take your medicine as directed.  Exercises    After about 2 to 3 weeks, you may be given exercises to strengthen the ligaments and muscles in the ankle. Doing these exercises will help prevent another ankle  sprain. Exercises may include standing on your toes and then on your heels and doing ankle curls.  Ankle curls    Sit on the edge of a sturdy table or lie on your back.    Pull your toes toward you. Then point them away from you. Repeat for 2 to 3 minutes.   Date Last Reviewed: 1/1/2018 2000-2017 [employer]. [address] [place] [address]. All rights reserved. This information is not intended as a substitute for professional medical care. Always follow your healthcare professional's instructions.          Discharge References/Attachments     FOOT SPRAIN (ENGLISH)      Your next 10 appointments already scheduled     Aug 15, 2018  1:15 PM CDT   immunization with HC SHOT ROOM   [facility] [place] ([facility] [place] )    [address] [place] [address]   [phone]                 Review of your medicines      START taking        Dose / Directions Last dose taken    naproxen 500 MG tablet   Commonly known as:  NAPROSYN   Dose:  500 mg   Quantity:  24 tablet        Take 1 tablet (500 mg) by mouth 3 times daily (with meals) for 8 days   Refills:  0        traMADol 50 MG tablet   Commonly known as:  ULTRAM   Dose:   mg   Quantity:  15 tablet        Take 1-2 tablets ( mg) by mouth every 6 hours as needed for breakthrough pain or severe pain   Refills:  0          Our records show that you are taking the medicines listed below. If these are incorrect, please call your family doctor or clinic.        Dose / Directions Last dose taken    ibuprofen 800 MG tablet   Commonly known as:  ADVIL/MOTRIN   Dose:  800 mg   Quantity:  60 tablet        Take 1 tablet (800 mg) by mouth every 8 hours as needed for moderate pain   Refills:  0        medroxyPROGESTERone 150 MG/ML injection   Commonly known as:  DEPO-PROVERA   Dose:  150 mg   Quantity:  1 mL        Inject 1 mL (150 mg) into the muscle every 3 months   Refills:  3        order for DME   Quantity:  1 Units         Equipment being ordered: right ASO brace for ankle; right knee brace patellar stabilization brace   Refills:  0                Information about OPIOIDS     PRESCRIPTION OPIOIDS: WHAT YOU NEED TO KNOW   We gave you an opioid (narcotic) pain medicine. It is important to manage your pain, but opioids are not always the best choice. You should first try all the other options your care team gave you. Take this medicine for as short a time (and as few doses) as possible.     These medicines have risks:    DO NOT drive when on new or higher doses of pain medicine. These medicines can affect your alertness and reaction times, and you could be arrested for driving under the influence (DUI). If you need to use opioids long-term, talk to your care team about driving.    DO NOT operate heave machinery    DO NOT do any other dangerous activities while taking these medicines.     DO NOT drink any alcohol while taking these medicines.      If the opioid prescribed includes acetaminophen, DO NOT take with any other medicines that contain acetaminophen. Read all labels carefully. Look for the word  acetaminophen  or  Tylenol.  Ask your pharmacist if you have questions or are unsure.    You can get addicted to pain medicines, especially if you have a history of addiction (chemical, alcohol or substance dependence). Talk to your care team about ways to reduce this risk.    Store your pills in a secure place, locked if possible. We will not replace any lost or stolen medicine. If you don t finish your medicine, please throw away (dispose) as directed by your pharmacist. The [place] Pollution Control Agency has more information about safe disposal: https://www.pca.[facility].[place]/living-green/managing-unwanted-medications.     All opioids tend to cause constipation. Drink plenty of water and eat foods that have a lot of fiber, such as fruits, vegetables, prune juice, apple juice and high-fiber cereal. Take a laxative (Miralax, milk of  magnesia, Colace, Senna) if you don t move your bowels at least every other day.         Prescriptions were sent or printed at these locations (2 Prescriptions)                   [id number] - [place], [place] - [address]    [address] , [place] [address]    Telephone:  [phone]   Fax:  [phone]   Hours:                  E-Prescribed (1 of 2)         naproxen (NAPROSYN) 500 MG tablet                 Printed at Department/Unit printer (1 of 2)         traMADol (ULTRAM) 50 MG tablet                Procedures and tests performed during your visit     Foot XR, G/E 3 views, left      Orders Needing Specimen Collection     None      Pending Results     Date and Time Order Name Status Description    7/24/2018 1431 Foot XR, G/E 3 views, left In process             Pending Culture Results     No orders found from 7/22/2018 to 7/25/2018.            Thank you for choosing [facility]       Thank you for choosing [facility] for your care. Our goal is always to provide you with excellent care. Hearing back from our patients is one way we can continue to improve our services. Please take a few minutes to complete the written survey that you may receive in the mail after you visit with us. Thank you!        Care EveryWhere ID     This is your Care EveryWhere ID. This could be used by other organizations to access your [facility] medical records  [id number]        Equal Access to Services     BETTE MEADOWS AH: Shaina Banks, waaxda luqadaha, qaybta kaalmada ademike, justin hansen. So [facility] [phone].    ATENCIÓN: Si habla español, tiene a esqueda disposición servicios gratuitos de asistencia lingüística. Llame al [phone].    We comply with applicable federal civil rights laws and [place] laws. We do not discriminate on the basis of race, color, national origin, age, disability, sex, sexual orientation, or gender identity.            After Visit Summary       This is your  record. Keep this with you and show to your community pharmacist(s) and doctor(s) at your next visit.

## 2018-07-24 NOTE — DISCHARGE INSTRUCTIONS
Treating Ankle Sprains  Treatment will depend on how bad your sprain is. For a severe sprain, healing may take 3 months or more.  Right after your injury: Use R.I.C.E.    Rest: At first, keep weight off the ankle as much as you can. You may be given crutches to help you walk without putting weight on the ankle.    Ice: Put an ice pack on the ankle for 20 minutes. Remove the pack and wait at least 30 minutes. Repeat for up to 3 days. This helps reduce swelling.    Compression: To reduce swelling and keep the joint stable, you may need to wrap the ankle with an elastic bandage. For more severe sprains, you may need an ankle brace, a boot, or a cast.    Elevation: To reduce swelling, keep your ankle raised above your heart when you sit or lie down.  Medicine  Your healthcare provider may suggest oral nonsteroidal anti-inflammatory medicine (NSAIDs), such as ibuprofen. This relieves the pain and helps reduce swelling. Be sure to take your medicine as directed.  Exercises    After about 2 to 3 weeks, you may be given exercises to strengthen the ligaments and muscles in the ankle. Doing these exercises will help prevent another ankle sprain. Exercises may include standing on your toes and then on your heels and doing ankle curls.  Ankle curls    Sit on the edge of a sturdy table or lie on your back.    Pull your toes toward you. Then point them away from you. Repeat for 2 to 3 minutes.   Date Last Reviewed: 1/1/2018 2000-2017 [employer]. [address] [place] [address], [place] [address]. All rights reserved. This information is not intended as a substitute for professional medical care. Always follow your healthcare professional's instructions.

## 2018-07-24 NOTE — ED TRIAGE NOTES
"Pt presents today alone for c/o continued pain in left foot/ankle says she has called the clinic many times to try and get in with PCP and keeps being told they will call her back and aren't, she has fallen with the crutches, has 6 kids at home and is in a lot of pain and says she is ready to \"cut it off.\" Area is swollen and bruised, pt says it looks worse than it was on Saturday when she was in, and says she has limited ROM in her toes.   "

## 2018-07-24 NOTE — TELEPHONE ENCOUNTER
Pt called back and is wanting to get in asap. Pt was going to go to an ortho but she needs a referral. Please see below. Thank you.

## 2018-07-24 NOTE — ED AVS SNAPSHOT
[facility]    [address] [address]    Phone:  [phone]                                       Yulissa Grande   MRN: [id number]    Department[facility]  [facility]   Date of Visit:  7/24/2018           After Visit Summary Signature Page     I have received my discharge instructions, and my questions have been answered. I have discussed any challenges I see with this plan with the nurse or doctor.    ..........................................................................................................................................  Patient/Patient Representative Signature      ..........................................................................................................................................  Patient Representative Print Name and Relationship to Patient    ..................................................               ................................................  Date                                            Time    ..........................................................................................................................................  Reviewed by Signature/Title    ...................................................              ..............................................  Date                                                            Time

## 2018-07-30 ENCOUNTER — TELEPHONE (OUTPATIENT)
Dept: FAMILY MEDICINE | Facility: OTHER | Age: 28
End: 2018-07-30

## 2018-07-30 ENCOUNTER — TRANSFERRED RECORDS (OUTPATIENT)
Dept: HEALTH INFORMATION MANAGEMENT | Facility: CLINIC | Age: 28
End: 2018-07-30

## 2018-07-30 NOTE — TELEPHONE ENCOUNTER
LVM for patient with apt date and time, I asked that patient call back if appointment did not work for her

## 2018-07-30 NOTE — TELEPHONE ENCOUNTER
2:14 PM    Reason for Call: OVERBOOK    Patient is having the following symptoms: foot pain / MRI referral  for Injury on last Friday    The patient is requesting an appointment for  ASAP with Dr. Jj    Was an appointment offered for this call?   No    Preferred method for responding to this message: [phone]    If we cannot reach you directly, may we leave a detailed response at the number you provided?  Yes        Cesilia Ramos

## 2018-08-02 ENCOUNTER — OFFICE VISIT (OUTPATIENT)
Dept: FAMILY MEDICINE | Facility: OTHER | Age: 28
End: 2018-08-02
Attending: FAMILY MEDICINE
Payer: COMMERCIAL

## 2018-08-02 VITALS
DIASTOLIC BLOOD PRESSURE: 72 MMHG | RESPIRATION RATE: 18 BRPM | HEART RATE: 63 BPM | OXYGEN SATURATION: 98 % | SYSTOLIC BLOOD PRESSURE: 126 MMHG | TEMPERATURE: 98.5 F

## 2018-08-02 DIAGNOSIS — M25.572 PAIN IN JOINT INVOLVING ANKLE AND FOOT, LEFT: Primary | ICD-10-CM

## 2018-08-02 PROCEDURE — G0463 HOSPITAL OUTPT CLINIC VISIT: HCPCS

## 2018-08-02 PROCEDURE — 99213 OFFICE O/P EST LOW 20 MIN: CPT | Performed by: FAMILY MEDICINE

## 2018-08-02 RX ORDER — HYDROCODONE BITARTRATE AND ACETAMINOPHEN 5; 325 MG/1; MG/1
TABLET ORAL
COMMUNITY
Start: 2018-07-30 | End: 2018-09-20

## 2018-08-02 ASSESSMENT — ANXIETY QUESTIONNAIRES
1. FEELING NERVOUS, ANXIOUS, OR ON EDGE: NOT AT ALL
GAD7 TOTAL SCORE: 0
5. BEING SO RESTLESS THAT IT IS HARD TO SIT STILL: NOT AT ALL
2. NOT BEING ABLE TO STOP OR CONTROL WORRYING: NOT AT ALL
3. WORRYING TOO MUCH ABOUT DIFFERENT THINGS: NOT AT ALL
7. FEELING AFRAID AS IF SOMETHING AWFUL MIGHT HAPPEN: NOT AT ALL
4. TROUBLE RELAXING: NOT AT ALL
6. BECOMING EASILY ANNOYED OR IRRITABLE: NOT AT ALL

## 2018-08-02 ASSESSMENT — PAIN SCALES - GENERAL: PAINLEVEL: SEVERE PAIN (7)

## 2018-08-02 NOTE — NURSING NOTE
"Chief Complaint   Patient presents with     Musculoskeletal Problem       Initial /72 (BP Location: Right arm, Patient Position: Sitting, Cuff Size: Adult Regular)  Pulse 63  Temp 98.5  F (36.9  C) (Tympanic)  Resp 18  SpO2 98% Estimated body mass index is 34.33 kg/(m^2) as calculated from the following:    Height as of 5/23/18: 5' 4\" (1.626 m).    Weight as of 5/23/18: 200 lb (90.7 kg).  Medication Reconciliation: complete    Abril Henry LPN  "

## 2018-08-02 NOTE — MR AVS SNAPSHOT
After Visit Summary   8/2/2018    Yulissa Grande    MRN: [id number]           Patient Information     Date Of Birth          1990        Visit Information        Provider Department      8/2/2018 11:00 AM Olivia Jj MD [facility]        Today's Diagnoses     Pain in joint involving ankle and foot, left    -  1      Care Instructions    Continue ice, brace, crutches, NSAIDs.  Keep appointment with Dr. Lockhart for Monday for weight bearing xrays and repeat evaluation.  Consider additional imaging based on that visit.           Follow-ups after your visit        Your next 10 appointments already scheduled     Aug 15, 2018  1:15 PM CDT   immunization with HC SHOT ROOM   [facility] [place] ([facility] [place] )    [address]  [place] [address]   [phone]              Who to contact     If you have questions or need follow up information about today's clinic visit or your schedule please contact [facility] directly at [phone].  Normal or non-critical lab and imaging results will be communicated to you by MyChart, letter or phone within 4 business days after the clinic has received the results. If you do not hear from us within 7 days, please contact the clinic through MyChart or phone. If you have a critical or abnormal lab result, we will notify you by phone as soon as possible.  Submit refill requests through [employer] or call your pharmacy and they will forward the refill request to us. Please allow 3 business days for your refill to be completed.          Additional Information About Your Visit        Care EveryWhere ID     This is your Care EveryWhere ID. This could be used by other organizations to access your [facility] medical records  [id number]        Your Vitals Were     Pulse Temperature Respirations Pulse Oximetry          63 98.5  F (36.9  C) (Tympanic) 18 98%         Blood Pressure from Last 3 Encounters:   08/02/18  126/72   07/24/18 147/61   07/21/18 142/83    Weight from Last 3 Encounters:   05/23/18 200 lb (90.7 kg)   04/12/18 200 lb (90.7 kg)              Today, you had the following     No orders found for display      Information about OPIOIDS     PRESCRIPTION OPIOIDS: WHAT YOU NEED TO KNOW   We gave you an opioid (narcotic) pain medicine. It is important to manage your pain, but opioids are not always the best choice. You should first try all the other options your care team gave you. Take this medicine for as short a time (and as few doses) as possible.     These medicines have risks:    DO NOT drive when on new or higher doses of pain medicine. These medicines can affect your alertness and reaction times, and you could be arrested for driving under the influence (DUI). If you need to use opioids long-term, talk to your care team about driving.    DO NOT operate heave machinery    DO NOT do any other dangerous activities while taking these medicines.     DO NOT drink any alcohol while taking these medicines.      If the opioid prescribed includes acetaminophen, DO NOT take with any other medicines that contain acetaminophen. Read all labels carefully. Look for the word  acetaminophen  or  Tylenol.  Ask your pharmacist if you have questions or are unsure.    You can get addicted to pain medicines, especially if you have a history of addiction (chemical, alcohol or substance dependence). Talk to your care team about ways to reduce this risk.    Store your pills in a secure place, locked if possible. We will not replace any lost or stolen medicine. If you don t finish your medicine, please throw away (dispose) as directed by your pharmacist. The [place] Pollution Control Agency has more information about safe disposal: https://www.pca.state.[place]/living-green/managing-unwanted-medications.     All opioids tend to cause constipation. Drink plenty of water and eat foods that have a lot of fiber, such as fruits, vegetables,  prune juice, apple juice and high-fiber cereal. Take a laxative (Miralax, milk of magnesia, Colace, Senna) if you don t move your bowels at least every other day.          Primary Care Provider Office Phone # Fax #    Olivia Jj [phone] [address] [place] [address]        Equal Access to Services     [facility] : Hadii aad ku hadasho Soomaali, waaxda luqadaha, qaybta kaalmada adeegyada, waxay idiin hayaan adeeg kharash la'aan . So [facility] [phone].    ATENCIÓN: Si habla español, tiene a esqueda disposición servicios gratuitos de asistencia lingüística. Llame al [phone].    We comply with applicable federal civil rights laws and [place] laws. We do not discriminate on the basis of race, color, national origin, age, disability, sex, sexual orientation, or gender identity.            Thank you!     Thank you for choosing [facility] ([facility]  for your care. Our goal is always to provide you with excellent care. Hearing back from our patients is one way we can continue to improve our services. Please take a few minutes to complete the written survey that you may receive in the mail after your visit with us. Thank you!             Your Updated Medication List - Protect others around you: Learn how to safely use, store and throw away your medicines at www.disposemymeds.org.          This list is accurate as of 8/2/18 11:27 AM.  Always use your most recent med list.                   Brand Name Dispense Instructions for use Diagnosis    HYDROcodone-acetaminophen 5-325 MG per tablet    NORCO          medroxyPROGESTERone 150 MG/ML injection    DEPO-PROVERA    1 mL    Inject 1 mL (150 mg) into the muscle every 3 months    Encounter for initial prescription of injectable contraceptive       order for DME     1 Units    Equipment being ordered: right ASO brace for ankle; right knee brace patellar stabilization brace    Chronic pain of right knee, Pain in joint, ankle and  foot, right

## 2018-08-02 NOTE — PATIENT INSTRUCTIONS
Continue ice, brace, crutches, NSAIDs.  Keep appointment with Dr. Lockhart for Monday for weight bearing xrays and repeat evaluation.  Consider additional imaging based on that visit.

## 2018-08-02 NOTE — PROGRESS NOTES
SUBJECTIVE:   Yulissa Grande is a 28 year old female who presents to clinic today for the following health issues:      Musculoskeletal problem/pain      Duration: 2 weeks    Description  Location: left foot    Intensity:  7/10    Accompanying signs and symptoms: radiation of pain up left leg, warmth, swelling and redness    History  Previous similar problem: YES  Previous evaluation:  x-ray    Precipitating or alleviating factors:  Trauma or overuse: YES  Aggravating factors include: standing, walking, climbing stairs, exercise and overuse    Therapies tried and outcome: rest/inactivity, heat, ice, immobilization and naproxen    Seen 7/21 for left ankle injury.  Negative xray.    Seen again 7/24 in ER and treated with Naproxyn and Tramadol.    Fitted with ACE and stirrup brace and crutches for partial weight bearing.  Appointment scheduled with Dr. Lockhart 7/30/18.    Per patient, he examined her and told her to come back for weight bearing exercises on 8/6/18.      Patient is frustrated that she has so much pain still and the she doesn't know what is wrong.  She is wondering about an MRI.     She is still icing, taking NSIADs, using boot, and using crutches.  Unable to bear weight.           Problem list and histories reviewed & adjusted, as indicated.  Additional history: as documented    Current Outpatient Prescriptions   Medication     HYDROcodone-acetaminophen (NORCO) 5-325 MG per tablet     medroxyPROGESTERone (DEPO-PROVERA) 150 MG/ML injection     order for DME     No current facility-administered medications for this visit.        There is no problem list on file for this patient.    History reviewed. No pertinent surgical history.    Social History   Substance Use Topics     Smoking status: Never Smoker     Smokeless tobacco: Never Used     Alcohol use Yes      Comment: occasionally     Family History   Problem Relation Age of Onset     Other Cancer Mother      Chronic Obstructive Pulmonary Disease Mother       Hypertension Mother      Asthma Mother      Attention Deficit Disorder Mother      Irritable Bowel Syndrome Mother      Other Cancer Father      Hypertension Father      Alcoholism Father      Thyroid Disease Maternal Grandmother      Coronary Artery Disease Maternal Grandfather      Prostate Cancer Maternal Grandfather            Reviewed and updated as needed this visit by clinical staff       Reviewed and updated as needed this visit by Provider         ROS:  CONSTITUTIONAL:NEGATIVE for fever, chills, change in weight  INTEGUMENTARY/SKIN: NEGATIVE for worrisome rashes, POSITIVE for bruising  MUSCULOSKELETAL: POSITIVE  for arthralgias foot and ankle, left  NEURO: NEGATIVE for weakness, dizziness or paresthesias    OBJECTIVE:     /72 (BP Location: Right arm, Patient Position: Sitting, Cuff Size: Adult Regular)  Pulse 63  Temp 98.5  F (36.9  C) (Tympanic)  Resp 18  SpO2 98%  There is no height or weight on file to calculate BMI.  GENERAL: healthy, alert and no distress  MS: normal muscle tone, peripheral pulses normal and left foot with moderate swelling; she is tender throughout, both malleoli, base of 5th metatarsal, midfoot, toes; worse any manipulation of foot/ankle  SKIN: ecchymosis of left foot  NEURO: Normal strength and tone, mentation intact and speech normal  PSYCH: mentation appears normal, affect normal/bright    Diagnostic Test Results:  Results for orders placed or performed during the hospital encounter of 07/24/18   Foot XR, G/E 3 views, left    Narrative    Exam: XR FOOT LT G/E 3 VW     History:Female, age 28 years, left foot pain, fall 3 days ago;     Comparison:  None    Technique: Three views are submitted.    Findings: Bones are normally mineralized. No evidence of acute or  subacute fracture.  No evidence of dislocation.           Impression    Impression:  1.  No evidence of acute or subacute bony abnormality.    COY SCHAEFFER MD       ASSESSMENT/PLAN:       (M25.572) Pain in  joint involving ankle and foot, left  (primary encounter diagnosis)  Comment: concern for soft tissue injury.    Did not feel that ordering an MRI today would significantly change her course.  Best to follow with specialist as scheduled.  If questions after that visit or if I can facilitate any additional treatment plan, to let me know.  Patient was agreeable to plan.    Patient Instructions   Continue ice, brace, crutches, NSAIDs.  Keep appointment with Dr. Lockhart for Monday for weight bearing xrays and repeat evaluation.  Consider additional imaging based on that visit.           Olivia So MD  [facility]

## 2018-08-03 ENCOUNTER — TELEPHONE (OUTPATIENT)
Dept: FAMILY MEDICINE | Facility: OTHER | Age: 28
End: 2018-08-03

## 2018-08-03 ASSESSMENT — PATIENT HEALTH QUESTIONNAIRE - PHQ9: SUM OF ALL RESPONSES TO PHQ QUESTIONS 1-9: 0

## 2018-08-03 ASSESSMENT — ANXIETY QUESTIONNAIRES: GAD7 TOTAL SCORE: 0

## 2018-08-06 ENCOUNTER — TRANSFERRED RECORDS (OUTPATIENT)
Dept: HEALTH INFORMATION MANAGEMENT | Facility: CLINIC | Age: 28
End: 2018-08-06

## 2018-08-13 ENCOUNTER — HOSPITAL ENCOUNTER (OUTPATIENT)
Dept: MRI IMAGING | Facility: HOSPITAL | Age: 28
Discharge: HOME | End: 2018-08-13
Attending: PODIATRIST | Admitting: PODIATRIST
Payer: COMMERCIAL

## 2018-08-13 ENCOUNTER — HOSPITAL ENCOUNTER (OUTPATIENT)
Dept: MRI IMAGING | Facility: HOSPITAL | Age: 28
End: 2018-08-13
Attending: PODIATRIST
Payer: COMMERCIAL

## 2018-08-13 DIAGNOSIS — R60.9 SWELLING: ICD-10-CM

## 2018-08-13 DIAGNOSIS — M25.572 PAIN, JOINT, ANKLE AND FOOT, LEFT: ICD-10-CM

## 2018-08-13 PROCEDURE — 73718 MRI LOWER EXTREMITY W/O DYE: CPT | Mod: TC,LT

## 2018-08-13 PROCEDURE — 73721 MRI JNT OF LWR EXTRE W/O DYE: CPT | Mod: TC,LT

## 2018-08-20 ENCOUNTER — ALLIED HEALTH/NURSE VISIT (OUTPATIENT)
Dept: ALLERGY | Facility: OTHER | Age: 28
End: 2018-08-20
Attending: FAMILY MEDICINE
Payer: COMMERCIAL

## 2018-08-20 DIAGNOSIS — Z30.013 ENCOUNTER FOR INITIAL PRESCRIPTION OF INJECTABLE CONTRACEPTIVE: ICD-10-CM

## 2018-08-20 PROCEDURE — 96372 THER/PROPH/DIAG INJ SC/IM: CPT

## 2018-08-20 NOTE — MR AVS SNAPSHOT
After Visit Summary   8/20/2018    Yulissa Grande    MRN: [id number]           Patient Information     Date Of Birth          1990        Visit Information        Provider Department      8/20/2018 10:45 AM HC SHOT ROOM [facility]        Today's Diagnoses     Encounter for initial prescription of injectable contraceptive           Follow-ups after your visit        Who to contact     If you have questions or need follow up information about today's clinic visit or your schedule please contact [facility] directly at [phone].  Normal or non-critical lab and imaging results will be communicated to you by MyChart, letter or phone within 4 business days after the clinic has received the results. If you do not hear from us within 7 days, please contact the clinic through MyChart or phone. If you have a critical or abnormal lab result, we will notify you by phone as soon as possible.  Submit refill requests through [employer] or call your pharmacy and they will forward the refill request to us. Please allow 3 business days for your refill to be completed.          Additional Information About Your Visit        Care EveryWhere ID     This is your Care EveryWhere ID. This could be used by other organizations to access your [facility] medical records  [id number]         Blood Pressure from Last 3 Encounters:   08/02/18 126/72   07/24/18 147/61   07/21/18 142/83    Weight from Last 3 Encounters:   05/23/18 200 lb (90.7 kg)   04/12/18 200 lb (90.7 kg)              We Performed the Following     C Medroxyprogesterone inj/1mg     INJECTION INTRAMUSCULAR OR SUB-Q        Primary Care Provider Office Phone # Fax #    Olivia Jj [phone]       [address] [place] [address]        Equal Access to Services     BETTE MEADOWS : jania Collier, justin woods. So [facility]  [phone].    ATENCIÓN: Si floridalma pérez, tiene a esuqeda disposición servicios gratuitos de asistencia lingüística. Alessia meehan [phone].    We comply with applicable federal civil rights laws and [place] laws. We do not discriminate on the basis of race, color, national origin, age, disability, sex, sexual orientation, or gender identity.            Thank you!     [facility] you [facility]  [facility]. Our goal is always to provide you with excellent care. Hearing back from our patients is one way we can continue to improve our services. Please take a few minutes to complete the written survey that you may receive in the mail after your visit with us. Thank you!             Your Updated Medication List - Protect others around you: Learn how to safely use, store and throw away your medicines at www.disposemymeds.org.          This list is accurate as of 8/20/18 10:55 AM.  Always use your most recent med list.                   Brand Name Dispense Instructions for use Diagnosis    HYDROcodone-acetaminophen 5-325 MG per tablet    NORCO          medroxyPROGESTERone 150 MG/ML injection    DEPO-PROVERA    1 mL    Inject 1 mL (150 mg) into the muscle every 3 months    Encounter for initial prescription of injectable contraceptive       order for DME     1 Units    Equipment being ordered: right ASO brace for ankle; right knee brace patellar stabilization brace    Chronic pain of right knee, Pain in joint, ankle and foot, right

## 2018-08-20 NOTE — PROGRESS NOTES
The following medication was given:     MEDICATION: Depo Provera 150mg  ROUTE: IM  [facility]  DOSE: 1 mL  LOT #: [id number]  :  [employer]   EXPIRATION DATE:  6/30/2020  NDC#: [address]-9  Patient will return between 11/5/18-11/19/18.    Alison Valdivia

## 2018-09-20 ENCOUNTER — OFFICE VISIT (OUTPATIENT)
Dept: FAMILY MEDICINE | Facility: OTHER | Age: 28
End: 2018-09-20
Attending: FAMILY MEDICINE
Payer: COMMERCIAL

## 2018-09-20 VITALS
TEMPERATURE: 98.3 F | SYSTOLIC BLOOD PRESSURE: 116 MMHG | HEART RATE: 77 BPM | WEIGHT: 205 LBS | RESPIRATION RATE: 16 BRPM | HEIGHT: 65 IN | OXYGEN SATURATION: 98 % | DIASTOLIC BLOOD PRESSURE: 68 MMHG | BODY MASS INDEX: 34.16 KG/M2

## 2018-09-20 DIAGNOSIS — J01.90 ACUTE SINUSITIS WITH SYMPTOMS > 10 DAYS: Primary | ICD-10-CM

## 2018-09-20 PROCEDURE — 99213 OFFICE O/P EST LOW 20 MIN: CPT | Performed by: FAMILY MEDICINE

## 2018-09-20 PROCEDURE — G0463 HOSPITAL OUTPT CLINIC VISIT: HCPCS

## 2018-09-20 RX ORDER — AMOXICILLIN AND CLAVULANATE POTASSIUM 400; 57 MG/5ML; MG/5ML
POWDER, FOR SUSPENSION ORAL
Qty: 140 ML | Refills: 0 | Status: SHIPPED | OUTPATIENT
Start: 2018-09-20 | End: 2018-10-29

## 2018-09-20 RX ORDER — FLUTICASONE PROPIONATE 50 MCG
1-2 SPRAY, SUSPENSION (ML) NASAL DAILY
Qty: 1 BOTTLE | Refills: 3 | Status: SHIPPED | OUTPATIENT
Start: 2018-09-20 | End: 2018-10-29

## 2018-09-20 ASSESSMENT — ANXIETY QUESTIONNAIRES
3. WORRYING TOO MUCH ABOUT DIFFERENT THINGS: NOT AT ALL
6. BECOMING EASILY ANNOYED OR IRRITABLE: NOT AT ALL
GAD7 TOTAL SCORE: 0
7. FEELING AFRAID AS IF SOMETHING AWFUL MIGHT HAPPEN: NOT AT ALL
4. TROUBLE RELAXING: NOT AT ALL
5. BEING SO RESTLESS THAT IT IS HARD TO SIT STILL: NOT AT ALL
1. FEELING NERVOUS, ANXIOUS, OR ON EDGE: NOT AT ALL
2. NOT BEING ABLE TO STOP OR CONTROL WORRYING: NOT AT ALL

## 2018-09-20 ASSESSMENT — PAIN SCALES - GENERAL: PAINLEVEL: SEVERE PAIN (7)

## 2018-09-20 NOTE — PROGRESS NOTES
SUBJECTIVE:                                                    Yulissa Grande is a 28 year old female who presents to clinic today for the following health issues:      RESPIRATORY SYMPTOMS      Duration: two and half weeks    Description  nasal congestion, sore throat, facial pain/pressure, cough, ear pain left, headache and conjunctival irritation    Severity: moderate    Accompanying signs and symptoms: None    History (predisposing factors):  none    Precipitating or alleviating factors: None    Therapies tried and outcome:  Ibuprofen, Nyquil, OTC cold remedies    No vomiting or diarrhea    Entire family sick    No fevers    Problem list and histories reviewed & adjusted, as indicated.  Additional history: as documented    Current Outpatient Prescriptions   Medication     amoxicillin-clavulanate (AUGMENTIN) 875-125 MG per tablet     fluticasone (FLONASE) 50 MCG/ACT spray     medroxyPROGESTERone (DEPO-PROVERA) 150 MG/ML injection     order for DME     No current facility-administered medications for this visit.        There is no problem list on file for this patient.    No past surgical history on file.    Social History   Substance Use Topics     Smoking status: Never Smoker     Smokeless tobacco: Never Used     Alcohol use Yes      Comment: occasionally     Family History   Problem Relation Age of Onset     Other Cancer Mother      Chronic Obstructive Pulmonary Disease Mother      Hypertension Mother      Asthma Mother      Attention Deficit Disorder Mother      Irritable Bowel Syndrome Mother      Other Cancer Father      Hypertension Father      Alcoholism Father      Thyroid Disease Maternal Grandmother      Coronary Artery Disease Maternal Grandfather      Prostate Cancer Maternal Grandfather            ROS:  Constitutional, HEENT, cardiovascular, pulmonary, gi and gu systems are negative, except as otherwise noted.    OBJECTIVE:     /68 (BP Location: Right arm, Patient Position: Sitting, Cuff Size:  "Adult Large)  Pulse 77  Temp 98.3  F (36.8  C) (Tympanic)  Resp 16  Ht 5' 5\" (1.651 m)  Wt 205 lb (93 kg)  SpO2 98%  BMI 34.11 kg/m2  Body mass index is 34.11 kg/(m^2).  GENERAL: healthy, alert and no distress  EYES: Eyes grossly normal to inspection, PERRL and conjunctivae and sclerae normal  HENT: normal cephalic/atraumatic, ear canals and TM's normal, nasal mucosa edematous , rhinorrhea clear and yellow, oral mucous membranes moist, sinuses: maxillary tenderness on left and post nasal drainage noted  NECK: cervical adenopathy anterior, mild, no asymmetry, masses, or scars and thyroid normal to palpation  RESP: lungs clear to auscultation - no rales, rhonchi or wheezes  CV: regular rate and rhythm, normal S1 S2, no S3 or S4, no murmur, click or rub, no peripheral edema and peripheral pulses strong  MS: no gross musculoskeletal defects noted, no edema  PSYCH: mentation appears normal, affect normal/bright    Diagnostic Test Results:  none     ASSESSMENT/PLAN:     (J01.90) Acute sinusitis with symptoms > 10 days  (primary encounter diagnosis)  Plan: amoxicillin-clavulanate (AUGMENTIN) 875-125 MG         per tablet, fluticasone (FLONASE) 50 MCG/ACT         spray    Patient Instructions   Complete antibiotics.  Daily yogurt or probiotic advised.  Keep hydrated.  Saline sinus rinses.  Use daily nasal steroid spray Flonase.  Can use decongestant such as Afrin or Sudafed short term.  Ok to also use antihistamine such as Claritin.            Olivia So MD  [facility] [place]      "

## 2018-09-20 NOTE — PATIENT INSTRUCTIONS
Complete antibiotics.  Daily yogurt or probiotic advised.  Keep hydrated.  Saline sinus rinses.  Use daily nasal steroid spray Flonase.  Can use decongestant such as Afrin or Sudafed short term.  Ok to also use antihistamine such as Claritin.

## 2018-09-20 NOTE — MR AVS SNAPSHOT
"              After Visit Summary   9/20/2018    Yulissa Grande    MRN: [id number]           Patient Information     Date Of Birth          1990        Visit Information        Provider Department      9/20/2018 9:15 AM Olivia Jj MD [facility] [place]        Today's Diagnoses     Acute sinusitis with symptoms > 10 days    -  1      Care Instructions    Complete antibiotics.  Daily yogurt or probiotic advised.  Keep hydrated.  Saline sinus rinses.  Use daily nasal steroid spray Flonase.  Can use decongestant such as Afrin or Sudafed short term.  Ok to also use antihistamine such as Claritin.          Follow-ups after your visit        Who to contact     If you have questions or need follow up information about today's clinic visit or your schedule please contact [facility] directly at [phone].  Normal or non-critical lab and imaging results will be communicated to you by PR Slideshart, letter or phone within 4 business days after the clinic has received the results. If you do not hear from us within 7 days, please contact the clinic through PR Slideshart or phone. If you have a critical or abnormal lab result, we will notify you by phone as soon as possible.  Submit refill requests through THERAVECTYS or call your pharmacy and they will forward the refill request to us. Please allow 3 business days for your refill to be completed.          Additional Information About Your Visit        MyChart Information     THERAVECTYS lets you send messages to your doctor, view your test results, renew your prescriptions, schedule appointments and more. To sign up, go to [email or web address] . Click on \"Log in\" on the left side of the screen, which will take you to the Welcome page. Then click on \"Sign up Now\" on the right side of the page.     You will be asked to enter the access code listed below, as well as some personal information. Please follow the directions to create your username " "and password.     Your access code is: [email or web address]  Expires: 2018  9:44 AM     Your access code will  in 90 days. If you need help or a new code, please call your [facility] or [phone].        Care EveryWhere ID     This is your Care EveryWhere ID. This could be used by other organizations to access your [place] medical records  [id number]        Your Vitals Were     Pulse Temperature Respirations Height Pulse Oximetry BMI (Body Mass Index)    77 98.3  F (36.8  C) (Tympanic) 16 5' 5\" (1.651 m) 98% 34.11 kg/m2       Blood Pressure from Last 3 Encounters:   18 116/68   18 126/72   18 147/61    Weight from Last 3 Encounters:   18 205 lb (93 kg)   18 200 lb (90.7 kg)   18 200 lb (90.7 kg)              Today, you had the following     No orders found for display         Today's Medication Changes          These changes are accurate as of 18  9:44 AM.  If you have any questions, ask your nurse or doctor.               Start taking these medicines.        Dose/Directions    amoxicillin-clavulanate 875-125 MG per tablet   Commonly known as:  AUGMENTIN   Used for:  Acute sinusitis with symptoms > 10 days        Dose:  1 tablet   Take 1 tablet by mouth 2 times daily for 7 days   Quantity:  14 tablet   Refills:  0       fluticasone 50 MCG/ACT spray   Commonly known as:  FLONASE   Used for:  Acute sinusitis with symptoms > 10 days        Dose:  1-2 spray   Spray 1-2 sprays into both nostrils daily   Quantity:  1 Bottle   Refills:  3         Stop taking these medicines if you haven't already. Please contact your care team if you have questions.     HYDROcodone-acetaminophen 5-325 MG per tablet   Commonly known as:  NORCO                Where to get your medicines      These medications were sent to [id number] - [place] - [address] AT [facility] OF  [address]  [address], [place] [address]     Phone:  [phone]     " amoxicillin-clavulanate 875-125 MG per tablet    fluticasone 50 MCG/ACT spray                Primary Care Provider Office Phone # Fax #    Olivia Jj [phone] [address]  [place] [address]        Equal Access to Services     [facility] DORI : Hadii luis hale hadasho Soomaali, waaxda luqadaha, qaybta kaalmada adeegyada, waxay leela cohennickijosue hansen. So [facility] [phone].    ATENCIÓN: Si habla español, tiene a esqueda disposición servicios gratuitos de asistencia lingüística. Alessia al [phone].    We comply with applicable federal civil rights laws and [place] laws. We do not discriminate on the basis of race, color, national origin, age, disability, sex, sexual orientation, or gender identity.            Thank you!     Thank you for choosing [facility]  for your care. Our goal is always to provide you with excellent care. Hearing back from our patients is one way we can continue to improve our services. Please take a few minutes to complete the written survey that you may receive in the mail after your visit with us. Thank you!             Your Updated Medication List - Protect others around you: Learn how to safely use, store and throw away your medicines at www.disposemymeds.org.          This list is accurate as of 9/20/18  9:44 AM.  Always use your most recent med list.                   Brand Name Dispense Instructions for use Diagnosis    amoxicillin-clavulanate 875-125 MG per tablet    AUGMENTIN    14 tablet    Take 1 tablet by mouth 2 times daily for 7 days    Acute sinusitis with symptoms > 10 days       fluticasone 50 MCG/ACT spray    FLONASE    1 Bottle    Spray 1-2 sprays into both nostrils daily    Acute sinusitis with symptoms > 10 days       medroxyPROGESTERone 150 MG/ML injection    DEPO-PROVERA    1 mL    Inject 1 mL (150 mg) into the muscle every 3 months    Encounter for initial prescription of injectable contraceptive        order for DME     1 Units    Equipment being ordered: right ASO brace for ankle; right knee brace patellar stabilization brace    Chronic pain of right knee, Pain in joint, ankle and foot, right

## 2018-09-20 NOTE — NURSING NOTE
"Chief Complaint   Patient presents with     URI       Initial /68 (BP Location: Right arm, Patient Position: Sitting, Cuff Size: Adult Large)  Pulse 77  Temp 98.3  F (36.8  C) (Tympanic)  Resp 16  Ht 5' 5\" (1.651 m)  Wt 205 lb (93 kg)  SpO2 98%  BMI 34.11 kg/m2 Estimated body mass index is 34.11 kg/(m^2) as calculated from the following:    Height as of this encounter: 5' 5\" (1.651 m).    Weight as of this encounter: 205 lb (93 kg).  Medication Reconciliation: complete    Annie Woods LPN    "

## 2018-09-21 ASSESSMENT — ANXIETY QUESTIONNAIRES: GAD7 TOTAL SCORE: 0

## 2018-09-21 ASSESSMENT — PATIENT HEALTH QUESTIONNAIRE - PHQ9: SUM OF ALL RESPONSES TO PHQ QUESTIONS 1-9: 0

## 2018-10-29 ENCOUNTER — OFFICE VISIT (OUTPATIENT)
Dept: FAMILY MEDICINE | Facility: OTHER | Age: 28
End: 2018-10-29
Attending: FAMILY MEDICINE
Payer: COMMERCIAL

## 2018-10-29 VITALS
DIASTOLIC BLOOD PRESSURE: 68 MMHG | BODY MASS INDEX: 33.61 KG/M2 | HEART RATE: 67 BPM | OXYGEN SATURATION: 98 % | SYSTOLIC BLOOD PRESSURE: 108 MMHG | WEIGHT: 202 LBS | TEMPERATURE: 98.3 F

## 2018-10-29 DIAGNOSIS — H57.11 EYE PAIN, RIGHT: Primary | ICD-10-CM

## 2018-10-29 PROCEDURE — G0463 HOSPITAL OUTPT CLINIC VISIT: HCPCS

## 2018-10-29 PROCEDURE — 99214 OFFICE O/P EST MOD 30 MIN: CPT | Performed by: FAMILY MEDICINE

## 2018-10-29 ASSESSMENT — ANXIETY QUESTIONNAIRES
5. BEING SO RESTLESS THAT IT IS HARD TO SIT STILL: NOT AT ALL
6. BECOMING EASILY ANNOYED OR IRRITABLE: NOT AT ALL
3. WORRYING TOO MUCH ABOUT DIFFERENT THINGS: NOT AT ALL
7. FEELING AFRAID AS IF SOMETHING AWFUL MIGHT HAPPEN: NOT AT ALL
2. NOT BEING ABLE TO STOP OR CONTROL WORRYING: NOT AT ALL
GAD7 TOTAL SCORE: 0
1. FEELING NERVOUS, ANXIOUS, OR ON EDGE: NOT AT ALL

## 2018-10-29 ASSESSMENT — PAIN SCALES - GENERAL: PAINLEVEL: SEVERE PAIN (6)

## 2018-10-29 ASSESSMENT — PATIENT HEALTH QUESTIONNAIRE - PHQ9
SUM OF ALL RESPONSES TO PHQ QUESTIONS 1-9: 0
5. POOR APPETITE OR OVEREATING: NOT AT ALL

## 2018-10-29 NOTE — NURSING NOTE
"Chief Complaint   Patient presents with     Eye Problem       Initial /68 (BP Location: Right arm, Patient Position: Sitting, Cuff Size: Adult Large)  Pulse 67  Temp 98.3  F (36.8  C) (Tympanic)  Wt 202 lb (91.6 kg)  SpO2 98%  BMI 33.61 kg/m2 Estimated body mass index is 33.61 kg/(m^2) as calculated from the following:    Height as of 9/20/18: 5' 5\" (1.651 m).    Weight as of this encounter: 202 lb (91.6 kg).  Medication Reconciliation: complete    Giovanna Witt LPN  "
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
## 2018-10-29 NOTE — MR AVS SNAPSHOT
After Visit Summary   10/29/2018    Yulissa Grande    MRN: [id number]           Patient Information     Date Of Birth          1990        Visit Information        Provider Department      10/29/2018 9:00 AM Sharyn Pearl MD [facility]         Follow-ups after your visit        Who to contact     If you have questions or need follow up information about today's clinic visit or your schedule please contact [facility] directly at [phone].  Normal or non-critical lab and imaging results will be communicated to you by MyChart, letter or phone within 4 business days after the clinic has received the results. If you do not hear from us within 7 days, please contact the clinic through MyChart or phone. If you have a critical or abnormal lab result, we will notify you by phone as soon as possible.  Submit refill requests through [employer] or call your pharmacy and they will forward the refill request to us. Please allow 3 business days for your refill to be completed.          Additional Information About Your Visit        Care EveryWhere ID     This is your Care EveryWhere ID. This could be used by other organizations to access your [place] medical records  [id number]        Your Vitals Were     Pulse Temperature Pulse Oximetry BMI (Body Mass Index)          67 98.3  F (36.8  C) (Tympanic) 98% 33.61 kg/m2         Blood Pressure from Last 3 Encounters:   10/29/18 108/68   09/20/18 116/68   08/02/18 126/72    Weight from Last 3 Encounters:   10/29/18 202 lb (91.6 kg)   09/20/18 205 lb (93 kg)   05/23/18 200 lb (90.7 kg)              Today, you had the following     No orders found for display         Today's Medication Changes          These changes are accurate as of 10/29/18 10:06 AM.  If you have any questions, ask your nurse or doctor.               Stop taking these medicines if you haven't already. Please contact your care team if you have  questions.     fluticasone 50 MCG/ACT spray   Commonly known as:  FLONASE   Stopped by:  Sharyn Pearl MD                    Primary Care Provider Office Phone # Fax #    Olivia Jj [phone] [address]  [facility] [address]        Equal Access to Services     [facility] : Hadii aad ku hadasho Soomaali, waaxda luqadaha, qaybta kaalmada adeegyada, waxay idiin hayaan adeeg kharash la'aan ah. So [facility] [phone].    ATENCIÓN: Si habla español, tiene a esqueda disposición servicios gratuitos de asistencia lingüística. Llame al [phone].    We comply with applicable federal civil rights laws and [place] laws. We do not discriminate on the basis of race, color, national origin, age, disability, sex, sexual orientation, or gender identity.            Thank you!     Thank you for choosing [facility]  for your care. Our goal is always to provide you with excellent care. Hearing back from our patients is one way we can continue to improve our services. Please take a few minutes to complete the written survey that you may receive in the mail after your visit with us. Thank you!             Your Updated Medication List - Protect others around you: Learn how to safely use, store and throw away your medicines at www.disposemymeds.org.          This list is accurate as of 10/29/18 10:06 AM.  Always use your most recent med list.                   Brand Name Dispense Instructions for use Diagnosis    medroxyPROGESTERone 150 MG/ML injection    DEPO-PROVERA    1 mL    Inject 1 mL (150 mg) into the muscle every 3 months    Encounter for initial prescription of injectable contraceptive       order for DME     1 Units    Equipment being ordered: right ASO brace for ankle; right knee brace patellar stabilization brace    Chronic pain of right knee, Pain in joint, ankle and foot, right

## 2018-10-30 ASSESSMENT — ANXIETY QUESTIONNAIRES: GAD7 TOTAL SCORE: 0

## 2018-11-05 DIAGNOSIS — M25.569 KNEE PAIN, UNSPECIFIED CHRONICITY, UNSPECIFIED LATERALITY: Primary | ICD-10-CM

## 2018-11-05 RX ORDER — IBUPROFEN 800 MG/1
800 TABLET, FILM COATED ORAL EVERY 8 HOURS PRN
Qty: 60 TABLET | Refills: 0 | Status: SHIPPED | OUTPATIENT
Start: 2018-11-05 | End: 2019-03-04

## 2018-11-05 NOTE — TELEPHONE ENCOUNTER
Ibuprofen  Last office visit: 10/29/18  Last refill: not listed on patient's current medication list.    Thank you.

## 2018-11-14 ENCOUNTER — ALLIED HEALTH/NURSE VISIT (OUTPATIENT)
Dept: ALLERGY | Facility: OTHER | Age: 28
End: 2018-11-14
Attending: FAMILY MEDICINE
Payer: COMMERCIAL

## 2018-11-14 DIAGNOSIS — Z23 NEED FOR PROPHYLACTIC VACCINATION AND INOCULATION AGAINST INFLUENZA: ICD-10-CM

## 2018-11-14 DIAGNOSIS — Z30.42 ENCOUNTER FOR SURVEILLANCE OF INJECTABLE CONTRACEPTIVE: Primary | ICD-10-CM

## 2018-11-14 PROCEDURE — 90686 IIV4 VACC NO PRSV 0.5 ML IM: CPT

## 2018-11-14 PROCEDURE — 90471 IMMUNIZATION ADMIN: CPT | Performed by: FAMILY MEDICINE

## 2018-11-14 PROCEDURE — 96372 THER/PROPH/DIAG INJ SC/IM: CPT

## 2018-11-14 NOTE — MR AVS SNAPSHOT
After Visit Summary   11/14/2018    Yulissa Grande    MRN: [id number]           Patient Information     Date Of Birth          1990        Visit Information        Provider Department      11/14/2018 9:15 AM HC SHOT ROOM [facility] [place]        Today's Diagnoses     Encounter for surveillance of injectable contraceptive    -  1    Need for prophylactic vaccination and inoculation against influenza           Follow-ups after your visit        Who to contact     If you have questions or need follow up information about today's clinic visit or your schedule please contact [facility] [place] directly at [phone].  Normal or non-critical lab and imaging results will be communicated to you by MyChart, letter or phone within 4 business days after the clinic has received the results. If you do not hear from us within 7 days, please contact the clinic through MyChart or phone. If you have a critical or abnormal lab result, we will notify you by phone as soon as possible.  Submit refill requests through [employer] or call your pharmacy and they will forward the refill request to us. Please allow 3 business days for your refill to be completed.          Additional Information About Your Visit        Care EveryWhere ID     This is your Care EveryWhere ID. This could be used by other organizations to access your [place] medical records  [id number]         Blood Pressure from Last 3 Encounters:   10/29/18 108/68   09/20/18 116/68   08/02/18 126/72    Weight from Last 3 Encounters:   10/29/18 202 lb (91.6 kg)   09/20/18 205 lb (93 kg)   05/23/18 200 lb (90.7 kg)              We Performed the Following     HC FLU VAC PRESRV FREE QUAD SPLIT VIR 3+YRS IM     Medroxyprogesterone inj/1mg (Depo Provera J-Code)     THER/PROPH/DIAG INJ, SC/IM        Primary Care Provider Office Phone # Fax #    Olivia Jj [phone] [address] [place] [address]         Equal Access to Services     [facility] : Hadii aad ku hadhemanthpaulette Nidiaali, wayolandada luqadaha, qaalejandrinata dillanshannonjustin mcfarlane. So [facility] [phone].    ATENCIÓN: Si habla español, tiene a esqueda disposición servicios gratuitos de asistencia lingüística. Kathyame al [phone].    We comply with applicable federal civil rights laws and [place] laws. We do not discriminate on the basis of race, color, national origin, age, disability, sex, sexual orientation, or gender identity.            Thank you!     Thank you for choosing [facility]  for your care. Our goal is always to provide you with excellent care. Hearing back from our patients is one way we can continue to improve our services. Please take a few minutes to complete the written survey that you may receive in the mail after your visit with us. Thank you!             Your Updated Medication List - Protect others around you: Learn how to safely use, store and throw away your medicines at www.disposemymeds.org.          This list is accurate as of 11/14/18  9:19 AM.  Always use your most recent med list.                   Brand Name Dispense Instructions for use Diagnosis    ibuprofen 800 MG tablet    ADVIL/MOTRIN    60 tablet    Take 1 tablet (800 mg) by mouth every 8 hours as needed for moderate pain    Knee pain, unspecified chronicity, unspecified laterality       medroxyPROGESTERone 150 MG/ML injection    DEPO-PROVERA    1 mL    Inject 1 mL (150 mg) into the muscle every 3 months    Encounter for initial prescription of injectable contraceptive       order for DME     1 Units    Equipment being ordered: right ASO brace for ankle; right knee brace patellar stabilization brace    Chronic pain of right knee, Pain in joint, ankle and foot, right

## 2018-11-14 NOTE — PROGRESS NOTES
The following medication was given:     MEDICATION: Depo Provera 150mg  ROUTE: IM  SITE: LUQ - Gluteus  DOSE: 1ml  LOT #: [id number]  :  [facility]   EXPIRATION DATE:  06/2020  NDC#: [address]-2  Patient will return for next injection between 1/30/19-2/13/19.  Milagros Rosas    Injectable Influenza Immunization Documentation    1.  Is the person to be vaccinated sick today?   No    2. Does the person to be vaccinated have an allergy to a component   of the vaccine?   No  Egg Allergy Algorithm Link    3. Has the person to be vaccinated ever had a serious reaction   to influenza vaccine in the past?   No    4. Has the person to be vaccinated ever had Guillain-Barré syndrome?   No    Form completed by Milagros Rosas

## 2019-02-06 DIAGNOSIS — Z30.42 ENCOUNTER FOR SURVEILLANCE OF INJECTABLE CONTRACEPTIVE: Primary | ICD-10-CM

## 2019-02-06 RX ORDER — MEDROXYPROGESTERONE ACETATE 150 MG/ML
150 INJECTION, SUSPENSION INTRAMUSCULAR
Status: COMPLETED | OUTPATIENT
Start: 2019-02-08 | End: 2019-02-08

## 2019-02-08 ENCOUNTER — ALLIED HEALTH/NURSE VISIT (OUTPATIENT)
Dept: ALLERGY | Facility: OTHER | Age: 29
End: 2019-02-08
Attending: FAMILY MEDICINE
Payer: COMMERCIAL

## 2019-02-08 DIAGNOSIS — Z30.42 ENCOUNTER FOR SURVEILLANCE OF INJECTABLE CONTRACEPTIVE: Primary | ICD-10-CM

## 2019-02-08 PROCEDURE — 96372 THER/PROPH/DIAG INJ SC/IM: CPT | Performed by: FAMILY MEDICINE

## 2019-02-08 RX ADMIN — MEDROXYPROGESTERONE ACETATE 150 MG: 150 INJECTION, SUSPENSION INTRAMUSCULAR at 11:26

## 2019-02-08 NOTE — PROGRESS NOTES
Prior to injection, verified patient identity using patient's name and date of birth.  Due to injection administration, patient instructed to remain in clinic for 15 minutes  afterwards, and to report any adverse reaction to me immediately.    BP: Data Unavailable    LAST PAP/EXAM: No results found for: PAP  URINE HCG:not indicated    NEXT INJECTION DUE: 4/26/19 - 5/10/19         Drug Amount Wasted:  None.  Vial/Syringe: Single dose vial  Expiration Date:  04/2021  MIGUELINA PIEDRA

## 2019-03-04 DIAGNOSIS — M25.569 KNEE PAIN, UNSPECIFIED CHRONICITY, UNSPECIFIED LATERALITY: ICD-10-CM

## 2019-03-05 RX ORDER — IBUPROFEN 800 MG/1
TABLET, FILM COATED ORAL
Qty: 60 TABLET | Refills: 0 | Status: SHIPPED | OUTPATIENT
Start: 2019-03-05 | End: 2019-08-28

## 2019-03-05 NOTE — TELEPHONE ENCOUNTER
ibuprofen (ADVIL/MOTRIN) 800 MG tablet      Last Written Prescription Date:  11/5/18  Last Fill Quantity: 60,   # refills: 0  Last Office Visit: 9/20/18  Future Office visit:       Routing refill request to provider for review/approval because:   Normal ALT on file in past 12 months    Normal AST on file in past 12 months    Normal CBC on file in past 12 months    Normal serum creatinine on file in past 12 months

## 2019-05-03 DIAGNOSIS — Z30.42 ENCOUNTER FOR SURVEILLANCE OF INJECTABLE CONTRACEPTIVE: Primary | ICD-10-CM

## 2019-05-03 RX ORDER — MEDROXYPROGESTERONE ACETATE 150 MG/ML
150 INJECTION, SUSPENSION INTRAMUSCULAR
Status: DISCONTINUED | OUTPATIENT
Start: 2019-05-06 | End: 2021-03-02

## 2019-05-03 NOTE — PROGRESS NOTES
Patient is on the schedule for a Depo-provera injection on 19, but order has .  Last office visit with you was 18.  Medication is pended if you approve.  Thank you.    Alexus Deras RN

## 2019-05-06 ENCOUNTER — ALLIED HEALTH/NURSE VISIT (OUTPATIENT)
Dept: ALLERGY | Facility: OTHER | Age: 29
End: 2019-05-06
Attending: FAMILY MEDICINE
Payer: COMMERCIAL

## 2019-05-06 DIAGNOSIS — Z30.42 ENCOUNTER FOR SURVEILLANCE OF INJECTABLE CONTRACEPTIVE: Primary | ICD-10-CM

## 2019-05-06 PROCEDURE — 96372 THER/PROPH/DIAG INJ SC/IM: CPT

## 2019-05-06 RX ADMIN — MEDROXYPROGESTERONE ACETATE 150 MG: 150 INJECTION, SUSPENSION INTRAMUSCULAR at 13:55

## 2019-08-05 ENCOUNTER — ALLIED HEALTH/NURSE VISIT (OUTPATIENT)
Dept: ALLERGY | Facility: OTHER | Age: 29
End: 2019-08-05
Attending: FAMILY MEDICINE
Payer: COMMERCIAL

## 2019-08-05 DIAGNOSIS — Z30.42 ENCOUNTER FOR SURVEILLANCE OF INJECTABLE CONTRACEPTIVE: Primary | ICD-10-CM

## 2019-08-05 PROCEDURE — 96372 THER/PROPH/DIAG INJ SC/IM: CPT

## 2019-08-05 RX ADMIN — MEDROXYPROGESTERONE ACETATE 150 MG: 150 INJECTION, SUSPENSION INTRAMUSCULAR at 13:23

## 2019-08-05 NOTE — PROGRESS NOTES
Clinic Administered Medication Documentation      Depo Provera Documentation    Prior to injection, verified patient identity using patient's name and date of birth. Medication was administered. Please see MAR and medication order for additional information. Patient instructed to remain in clinic for 15 minutes.    BP: Data Unavailable    LAST PAP/EXAM: No results found for: PAP  URINE HCG:not indicated    NEXT INJECTION DUE: 10/21/19 - 11/4/19    Was entire vial of medication used? Yes  Vial/Syringe: Single dose vial  Expiration Date:  07/2021    Milagros Rosas

## 2019-08-28 DIAGNOSIS — M25.569 KNEE PAIN, UNSPECIFIED CHRONICITY, UNSPECIFIED LATERALITY: ICD-10-CM

## 2019-08-29 RX ORDER — IBUPROFEN 800 MG/1
TABLET, FILM COATED ORAL
Qty: 60 TABLET | Refills: 0 | Status: SHIPPED | OUTPATIENT
Start: 2019-08-29 | End: 2019-11-04

## 2019-11-04 ENCOUNTER — ALLIED HEALTH/NURSE VISIT (OUTPATIENT)
Dept: ALLERGY | Facility: OTHER | Age: 29
End: 2019-11-04
Attending: FAMILY MEDICINE
Payer: COMMERCIAL

## 2019-11-04 DIAGNOSIS — M25.569 KNEE PAIN, UNSPECIFIED CHRONICITY, UNSPECIFIED LATERALITY: ICD-10-CM

## 2019-11-04 DIAGNOSIS — Z30.42 ENCOUNTER FOR SURVEILLANCE OF INJECTABLE CONTRACEPTIVE: Primary | ICD-10-CM

## 2019-11-04 PROCEDURE — 96372 THER/PROPH/DIAG INJ SC/IM: CPT

## 2019-11-04 RX ADMIN — MEDROXYPROGESTERONE ACETATE 150 MG: 150 INJECTION, SUSPENSION INTRAMUSCULAR at 14:42

## 2019-11-04 NOTE — PROGRESS NOTES
Clinic Administered Medication Documentation    MEDICATION LIST:   Depo Provera Documentation    Prior to injection, verified patient identity using patient's name and date of birth. Medication was administered. Please see MAR and medication order for additional information. Patient instructed to report any adverse reaction to staff immediately .    BP: Data Unavailable    LAST PAP/EXAM: No results found for: PAP  URINE HCG:not indicated    NEXT INJECTION DUE: 1/20/20 - 2/3/20    Was entire vial of medication used? Yes  Vial/Syringe: Single dose vial  Expiration Date:  11/2021    Right gluteus    Milagros Rosas LPN

## 2019-11-05 RX ORDER — IBUPROFEN 800 MG/1
TABLET, FILM COATED ORAL
Qty: 60 TABLET | Refills: 0 | Status: SHIPPED | OUTPATIENT
Start: 2019-11-05 | End: 2020-03-13

## 2020-01-10 ENCOUNTER — TELEPHONE (OUTPATIENT)
Dept: FAMILY MEDICINE | Facility: OTHER | Age: 30
End: 2020-01-10

## 2020-01-10 NOTE — TELEPHONE ENCOUNTER
Called, offered 1:15 appt to be seen today. Patient declined appt. And decided that she didn't want to make an appt.

## 2020-01-10 NOTE — TELEPHONE ENCOUNTER
7:34 AM    Reason for Call: OVERBOOK    Patient is having the following symptoms: cough// chest congestion for 5 days.    The patient is requesting an appointment for 01/10/2020 with .    Was an appointment offered for this call? Yes  If yes : Appointment type              Date 01/17/20 does not want to wait this long    Preferred method for responding to this message: Telephone Call  What is your phone number ?[phone]    If we cannot reach you directly, may we leave a detailed response at the number you provided? Yes    Can this message wait until your PCP/provider returns, if unavailable today? Not applicable, pcp is in     [facility]

## 2020-01-22 ENCOUNTER — ALLIED HEALTH/NURSE VISIT (OUTPATIENT)
Dept: ALLERGY | Facility: OTHER | Age: 30
End: 2020-01-22
Attending: FAMILY MEDICINE
Payer: COMMERCIAL

## 2020-01-22 DIAGNOSIS — Z30.42 ENCOUNTER FOR SURVEILLANCE OF INJECTABLE CONTRACEPTIVE: Primary | ICD-10-CM

## 2020-01-22 PROCEDURE — 96372 THER/PROPH/DIAG INJ SC/IM: CPT

## 2020-01-22 RX ADMIN — MEDROXYPROGESTERONE ACETATE 150 MG: 150 INJECTION, SUSPENSION INTRAMUSCULAR at 11:06

## 2020-01-22 NOTE — PROGRESS NOTES
Clinic Administered Medication Documentation    MEDICATION LIST:   Depo Provera Documentation    Prior to injection, verified patient identity using patient's name and date of birth. Medication was administered. Please see MAR and medication order for additional information. Patient instructed to remain in clinic for 15 minutes.    BP: Data Unavailable    LAST PAP/EXAM: No results found for: PAP  URINE HCG:not indicated    NEXT INJECTION DUE: 4/8/20 - 4/22/20    Was entire vial of medication used? Yes  Vial/Syringe: Single dose vial  Expiration Date:  11/21      Given left glut      OH CHAUHAN LPN

## 2020-03-13 ENCOUNTER — OFFICE VISIT (OUTPATIENT)
Dept: FAMILY MEDICINE | Facility: OTHER | Age: 30
End: 2020-03-13
Attending: FAMILY MEDICINE
Payer: MEDICAID

## 2020-03-13 ENCOUNTER — ANCILLARY PROCEDURE (OUTPATIENT)
Dept: GENERAL RADIOLOGY | Facility: OTHER | Age: 30
End: 2020-03-13
Attending: FAMILY MEDICINE
Payer: MEDICAID

## 2020-03-13 VITALS
HEART RATE: 77 BPM | TEMPERATURE: 98.7 F | WEIGHT: 199.6 LBS | DIASTOLIC BLOOD PRESSURE: 78 MMHG | BODY MASS INDEX: 33.26 KG/M2 | SYSTOLIC BLOOD PRESSURE: 138 MMHG | OXYGEN SATURATION: 98 % | HEIGHT: 65 IN

## 2020-03-13 DIAGNOSIS — M25.569 KNEE PAIN, UNSPECIFIED CHRONICITY, UNSPECIFIED LATERALITY: ICD-10-CM

## 2020-03-13 DIAGNOSIS — M79.674 PAIN OF RIGHT GREAT TOE: ICD-10-CM

## 2020-03-13 DIAGNOSIS — M79.674 PAIN OF RIGHT GREAT TOE: Primary | ICD-10-CM

## 2020-03-13 DIAGNOSIS — M54.17 LUMBOSACRAL RADICULOPATHY: ICD-10-CM

## 2020-03-13 PROCEDURE — 73660 X-RAY EXAM OF TOE(S): CPT | Mod: TC,RT

## 2020-03-13 PROCEDURE — 99213 OFFICE O/P EST LOW 20 MIN: CPT | Performed by: FAMILY MEDICINE

## 2020-03-13 PROCEDURE — G0463 HOSPITAL OUTPT CLINIC VISIT: HCPCS

## 2020-03-13 RX ORDER — IBUPROFEN 800 MG/1
800 TABLET, FILM COATED ORAL EVERY 8 HOURS PRN
Qty: 60 TABLET | Refills: 0 | Status: SHIPPED | OUTPATIENT
Start: 2020-03-13 | End: 2020-09-14

## 2020-03-13 ASSESSMENT — PAIN SCALES - GENERAL: PAINLEVEL: SEVERE PAIN (6)

## 2020-03-13 ASSESSMENT — MIFFLIN-ST. JEOR: SCORE: 1631.26

## 2020-03-13 NOTE — PATIENT INSTRUCTIONS
Xray of great toe today.  Will call with results.  Referral to PT.   Start with home exercises shown in handout.  Consider additional imaging if not improving.  Reminder - due for physical/pap/etc.

## 2020-03-13 NOTE — NURSING NOTE
"Chief Complaint   Patient presents with     Musculoskeletal Problem       Initial /78   Pulse 77   Temp 98.7  F (37.1  C)   Ht 1.651 m (5' 5\")   Wt 90.5 kg (199 lb 9.6 oz)   SpO2 98%   BMI 33.22 kg/m   Estimated body mass index is 33.22 kg/m  as calculated from the following:    Height as of this encounter: 1.651 m (5' 5\").    Weight as of this encounter: 90.5 kg (199 lb 9.6 oz).  Medication Reconciliation: complete  Tejal Pritchard LPN    "

## 2020-03-13 NOTE — PROGRESS NOTES
Subjective     Yulissa Grande is a 29 year old female who presents to clinic today for the following health issues:    HPI   Musculoskeletal problem/pain - right      Duration: 1-2 months    Description  Location: right leg from hip  down to big toe on right foot; knee most notable; big toe is newest - worse with plantar flexion    Intensity:  moderate, severe    Accompanying signs and symptoms: radiation of pain to sides of foot, numbness and tingling - tightness in muscles, felt like was going to burst, but no swelling present, sharp pain    History  Previous similar problem: no   Previous evaluation:  none    Precipitating or alleviating factors:  Trauma or overuse: no; had job as [employer] service for a couple months - couldn't handle due to pain with all the walking, and even fell  Aggravating factors include: walking, climbing stairs, exercise and overuse  No visible swelling, redness, rash    Therapies tried and outcome: rest/inactivity, heat, ice, immobilization, stretching, exercises, support wrap, acetaminophen and Ibuprofen -BID; not effective    No back pain    Hip pain - lateral - no groin pain    Numbness lateral thigh, right, since prior pregnancy    MRI left ankle - bone contusion, effusion.  Not her right side.    Current Outpatient Medications   Medication     ibuprofen (ADVIL/MOTRIN) 800 MG tablet     medroxyPROGESTERone (DEPO-PROVERA) 150 MG/ML injection     order for DME     Current Facility-Administered Medications   Medication     medroxyPROGESTERone (DEPO-PROVERA) injection 150 mg       There is no problem list on file for this patient.    History reviewed. No pertinent surgical history.    Social History     Tobacco Use     Smoking status: Never Smoker     Smokeless tobacco: Never Used   Substance Use Topics     Alcohol use: Yes     Comment: occasionally     Family History   Problem Relation Age of Onset     Other Cancer Mother      Chronic Obstructive Pulmonary Disease Mother      Hypertension  "Mother      Asthma Mother      Attention Deficit Disorder Mother      Irritable Bowel Syndrome Mother      Other Cancer Father      Hypertension Father      Alcoholism Father      Thyroid Disease Maternal Grandmother      Coronary Artery Disease Maternal Grandfather      Prostate Cancer Maternal Grandfather              Reviewed and updated as needed this visit by Provider  Tobacco  Allergies  Meds  Med Hx  Surg Hx  Fam Hx  Soc Hx        Review of Systems   ROS COMP: Constitutional, HEENT, cardiovascular, pulmonary, gi and gu systems are negative, except as otherwise noted.      Objective    /78   Pulse 77   Temp 98.7  F (37.1  C)   Ht 1.651 m (5' 5\")   Wt 90.5 kg (199 lb 9.6 oz)   SpO2 98%   BMI 33.22 kg/m    Body mass index is 33.22 kg/m .  Physical Exam   GENERAL: healthy, alert and no distress  RESP: lungs clear to auscultation - no rales, rhonchi or wheezes  CV: regular rate and rhythm, normal S1 S2, no S3 or S4, no murmur, click or rub, no peripheral edema and peripheral pulses strong  MS: normal muscle tone, normal range of motion, peripheral pulses normal, tenderness to palpation lumbar spine, paraspinal, right more than left and full AROM right hip, knee, ankle; knee ligaments stable; negative michael's; no crepitus; right great toe tenderness MTP joint; SLR positive right, negative left; pain with mobility of right great toe; no skin changes; normal gait; strength 5/5; non-tender greater trochanter  SKIN: no suspicious lesions or rashes  NEURO: Normal strength and tone, mentation intact and speech normal  PSYCH: mentation appears normal, affect normal/bright    Diagnostic Test Results:  Xray of toe today normal.       Assessment & Plan       ICD-10-CM    1. Pain of right great toe  M79.674 XR Toe Right G/E 2 Views   2. Lumbosacral radiculopathy  M54.17 PHYSICAL THERAPY REFERRAL     ibuprofen (ADVIL/MOTRIN) 800 MG tablet   3. Knee pain, unspecified chronicity, unspecified laterality  " "M25.569 ibuprofen (ADVIL/MOTRIN) 800 MG tablet        BMI:   Estimated body mass index is 33.22 kg/m  as calculated from the following:    Height as of this encounter: 1.651 m (5' 5\").    Weight as of this encounter: 90.5 kg (199 lb 9.6 oz).   Weight management plan: Discussed healthy diet and exercise guidelines    Her symptoms suggest lumbar radiculopathy.  Back TTP though didn't note back pain.  Radiates to hip, knee, great toe.  L5/S1?    Start with PT.  Consider additional imaging.    Patient Instructions   Xray of great toe today.  Will call with results.  Referral to PT.   Start with home exercises shown in handout.  Consider additional imaging if not improving.  Reminder - due for physical/pap/etc.            No follow-ups on file.    Olivia So MD  [facility] - [place]        "

## 2020-04-10 ENCOUNTER — ALLIED HEALTH/NURSE VISIT (OUTPATIENT)
Dept: ALLERGY | Facility: OTHER | Age: 30
End: 2020-04-10
Attending: FAMILY MEDICINE
Payer: COMMERCIAL

## 2020-04-10 DIAGNOSIS — Z30.42 ENCOUNTER FOR SURVEILLANCE OF INJECTABLE CONTRACEPTIVE: Primary | ICD-10-CM

## 2020-04-10 RX ADMIN — MEDROXYPROGESTERONE ACETATE 150 MG: 150 INJECTION, SUSPENSION INTRAMUSCULAR at 10:06

## 2020-04-10 NOTE — PROGRESS NOTES
Clinic Administered Medication Documentation      Depo Provera Documentation    URINE HCG: not indicated    Depo-Provera Standing Order inclusion/exclusion criteria reviewed.   Patient meets: inclusion criteria     BP: Data Unavailable  LAST PAP/EXAM: No results found for: PAP    Prior to injection, verified patient identity using patient's name and date of birth. Medication was administered. Please see MAR and medication order for additional information.     Was entire vial of medication used? Yes  Vial/Syringe: Single dose vial  Expiration Date:  11/2021    Patient instructed to stay in clinic after the injection but patient declined.  NEXT INJECTION DUE: 6/26/20 - 7/10/20    Elizabeth Valero LPN

## 2020-07-01 ENCOUNTER — ALLIED HEALTH/NURSE VISIT (OUTPATIENT)
Dept: ALLERGY | Facility: OTHER | Age: 30
End: 2020-07-01
Attending: FAMILY MEDICINE
Payer: COMMERCIAL

## 2020-07-01 DIAGNOSIS — Z30.42 ENCOUNTER FOR SURVEILLANCE OF INJECTABLE CONTRACEPTIVE: Primary | ICD-10-CM

## 2020-07-01 PROCEDURE — 96372 THER/PROPH/DIAG INJ SC/IM: CPT

## 2020-07-01 RX ADMIN — MEDROXYPROGESTERONE ACETATE 150 MG: 150 INJECTION, SUSPENSION INTRAMUSCULAR at 09:50

## 2020-07-01 NOTE — PROGRESS NOTES
Clinic Administered Medication Documentation      Depo Provera Documentation    URINE HCG: not indicated    Depo-Provera Standing Order inclusion/exclusion criteria reviewed.   Patient meets: inclusion criteria     BP: Data Unavailable  LAST PAP/EXAM: No results found for: PAP    Prior to injection, verified patient identity using patient's name and date of birth. Medication was administered. Please see MAR and medication order for additional information.     Was entire vial of medication used? Yes  Vial/Syringe: Single dose vial  Expiration Date:  11/2021    Patient instructed to report any adverse reaction to staff immediately .  NEXT INJECTION DUE: 9/16/20 - 9/30/20    Left gluteus.    Milagros Rosas LPN

## 2020-07-06 PROCEDURE — 96372 THER/PROPH/DIAG INJ SC/IM: CPT

## 2020-09-09 ENCOUNTER — TELEPHONE (OUTPATIENT)
Dept: FAMILY MEDICINE | Facility: OTHER | Age: 30
End: 2020-09-09
Payer: COMMERCIAL

## 2020-09-09 NOTE — TELEPHONE ENCOUNTER
4:47 PM    Reason for Call: OVERBOOK    Patient is having the following symptoms: shoulder/collar bone pain for 1 weeks.    The patient is requesting an appointment for asap with Dr. Jj.    Was an appointment offered for this call? No  If yes : Appointment type              Date    Preferred method for responding to this message: Telephone Call  What is your phone number ?[phone]    If we cannot reach you directly, may we leave a detailed response at the number you provided? Yes    Can this message wait until your PCP/provider returns, if unavailable today? Not applicable    Melina Jeffries

## 2020-09-11 NOTE — PROGRESS NOTES
"Subjective     Yulissa Grande is a 30 year old female who presents to clinic today for the following health issues:    HPI       Musculoskeletal problem/pain  Shoulder/Collar bone  Onset/Duration: bump -mass- on left side near collar bone area since early teens which has gotten bigger, right side by collar bone for 1 year  Description  Location: right and left side of collar bone area - left and right  Joint Swelling: no  Redness: no  Pain: no, states it feels \"annoying\" but not painful  Warmth: no  Intensity:  0/10  Progression of Symptoms:  worsening and constant  Accompanying signs and symptoms:   Fevers: no  Numbness/tingling/weakness: YES- down right arm to elbow and fingers  History  Trauma to the area: no  Recent illness:  no  Previous similar problem: no, right leg got numb during pregnancy that is still there intermittent  Previous evaluation:  no  Precipitating or alleviating factors:  Aggravating factors include: activity  Therapies tried and outcome: stretching and Ibuprofen 800 mg  No change in activity.  Less active if anything.  Right handed.  No xray prior.  Numbness/tingling prompted evaluation.    Also, wants refill of Ibuprofen 800 mg.  On average uses once per day.  Denies any GI side effects or abnormal bleeding.  Uses for knee, back, clavicle pain.  Had PT referral for back/knee, but was unable to complete due to COVID.  Has not had any labs in past year.      Review of Systems   CONSTITUTIONAL:NEGATIVE for fever, chills, change in weight  INTEGUMENTARY/SKIN: NEGATIVE for worrisome rashes, moles or lesions  MUSCULOSKELETAL: POSITIVE  for arthralgias knees, back, collar bones  NEURO: POSITIVE for paresthesias right UE and NEGATIVE for weakness   GI: NEGATIVE for nausea, abdominal pain  HEME: NEGATIVE for blood in urine or stool      Objective    /68 (BP Location: Right arm, Patient Position: Chair, Cuff Size: Adult Large)   Pulse 66   Temp 97.6  F (36.4  C) (Tympanic)   Resp 16   Ht " "1.651 m (5' 5\")   Wt 100.2 kg (221 lb)   SpO2 98%   BMI 36.78 kg/m    Body mass index is 36.78 kg/m .  Physical Exam   GENERAL: alert, no distress and over weight  NECK: no adenopathy, no asymmetry, masses, or scars and thyroid normal to palpation  RESP: lungs clear to auscultation - no rales, rhonchi or wheezes  CV: regular rate and rhythm, normal S1 S2, no S3 or S4, no murmur, click or rub, no peripheral edema and peripheral pulses strong  MS: normal muscle tone, normal range of motion of neck and UE bilaterally and peripheral pulses normal; there is asymmetry of clavicles and sternal junction, with right more prominent and tender; there is a subcutaneous 1-2 cm mobile, rubbery mass at the left sternoclavicular junction  SKIN: no suspicious lesions or rashes  NEURO: Normal strength and tone, mentation intact and speech normal  PSYCH: mentation appears normal, affect normal/bright    Results for orders placed or performed in visit on 09/14/20 (from the past 24 hour(s))   Comprehensive metabolic panel (BMP + Alb, Alk Phos, ALT, AST, Total. Bili, TP)   Result Value Ref Range    Sodium 138 133 - 144 mmol/L    Potassium 4.0 3.4 - 5.3 mmol/L    Chloride 107 94 - 109 mmol/L    Carbon Dioxide 27 20 - 32 mmol/L    Anion Gap 4 3 - 14 mmol/L    Glucose 90 70 - 99 mg/dL    Urea Nitrogen 11 7 - 30 mg/dL    Creatinine 0.79 0.52 - 1.04 mg/dL    GFR Estimate >90 >60 mL/min/[1.73_m2]    GFR Estimate If Black >90 >60 mL/min/[1.73_m2]    Calcium 9.3 8.5 - 10.1 mg/dL    Bilirubin Total 0.4 0.2 - 1.3 mg/dL    Albumin 3.9 3.4 - 5.0 g/dL    Protein Total 8.3 6.8 - 8.8 g/dL    Alkaline Phosphatase 121 40 - 150 U/L    ALT 29 0 - 50 U/L    AST 15 0 - 45 U/L   CBC with platelets and differential   Result Value Ref Range    WBC 6.1 4.0 - 11.0 10e9/L    RBC Count 5.14 3.8 - 5.2 10e12/L    Hemoglobin 15.1 11.7 - 15.7 g/dL    Hematocrit 45.2 35.0 - 47.0 %    MCV 88 78 - 100 fl    MCH 29.4 26.5 - 33.0 pg    MCHC 33.4 31.5 - 36.5 g/dL    RDW " "12.2 10.0 - 15.0 %    Platelet Count 329 150 - 450 10e9/L    Diff Method Automated Method     % Neutrophils 56.8 %    % Lymphocytes 34.3 %    % Monocytes 6.9 %    % Eosinophils 1.3 %    % Basophils 0.5 %    % Immature Granulocytes 0.2 %    Nucleated RBCs 0 0 /100    Absolute Neutrophil 3.5 1.6 - 8.3 10e9/L    Absolute Lymphocytes 2.1 0.8 - 5.3 10e9/L    Absolute Monocytes 0.4 0.0 - 1.3 10e9/L    Absolute Eosinophils 0.1 0.0 - 0.7 10e9/L    Absolute Basophils 0.0 0.0 - 0.2 10e9/L    Abs Immature Granulocytes 0.0 0 - 0.4 10e9/L    Absolute Nucleated RBC 0.0            Assessment & Plan     Clavicle pain  Xrays done. Formal read pending.  Upper back, trapezius pain - suspect more muscular, soft tissue, overuse, posture.  - XR CLAVICLE LT (Clinic Performed); Future  - XR CLAVICLE RT (Clinic Performed); Future  - ibuprofen (ADVIL/MOTRIN) 800 MG tablet; Take 1 tablet (800 mg) by mouth every 8 hours as needed for moderate pain    Asymmetry of clavicles  As above.  - XR CLAVICLE LT (Clinic Performed); Future  - XR CLAVICLE RT (Clinic Performed); Future    Encounter for medication monitoring  Given chronic intermittent Ibuprofen - baseline labs obtained.  - Comprehensive metabolic panel (BMP + Alb, Alk Phos, ALT, AST, Total. Bili, TP)  - CBC with platelets and differential    Sebaceous cyst  Asymptomatic.  Present for years.  Discussed excision if continuing to enlarge or causing symptoms.    Knee pain, unspecified chronicity, unspecified laterality  Stable.  - ibuprofen (ADVIL/MOTRIN) 800 MG tablet; Take 1 tablet (800 mg) by mouth every 8 hours as needed for moderate pain    Lumbosacral radiculopathy  Stable.  - ibuprofen (ADVIL/MOTRIN) 800 MG tablet; Take 1 tablet (800 mg) by mouth every 8 hours as needed for moderate pain     BMI:   Estimated body mass index is 36.78 kg/m  as calculated from the following:    Height as of this encounter: 1.651 m (5' 5\").    Weight as of this encounter: 100.2 kg (221 lb).   Weight " management plan: Discussed healthy diet and exercise guidelines    Patient Instructions   Will call with xray results.  Labs normal.  Ibuprofen refilled.  PT referral.  If left sided cyst continues to enlarge or becomes symptomatic, call for referral to surgery.        See Patient Instructions    No follow-ups on file.    Olivia So MD  [facility]

## 2020-09-14 ENCOUNTER — ANCILLARY PROCEDURE (OUTPATIENT)
Dept: GENERAL RADIOLOGY | Facility: OTHER | Age: 30
End: 2020-09-14
Attending: FAMILY MEDICINE
Payer: COMMERCIAL

## 2020-09-14 ENCOUNTER — OFFICE VISIT (OUTPATIENT)
Dept: FAMILY MEDICINE | Facility: OTHER | Age: 30
End: 2020-09-14
Attending: FAMILY MEDICINE
Payer: COMMERCIAL

## 2020-09-14 VITALS
DIASTOLIC BLOOD PRESSURE: 68 MMHG | WEIGHT: 221 LBS | HEART RATE: 66 BPM | BODY MASS INDEX: 36.82 KG/M2 | TEMPERATURE: 97.6 F | RESPIRATION RATE: 16 BRPM | SYSTOLIC BLOOD PRESSURE: 120 MMHG | OXYGEN SATURATION: 98 % | HEIGHT: 65 IN

## 2020-09-14 DIAGNOSIS — Z51.81 ENCOUNTER FOR MEDICATION MONITORING: ICD-10-CM

## 2020-09-14 DIAGNOSIS — Q74.0 ASYMMETRY OF CLAVICLES: ICD-10-CM

## 2020-09-14 DIAGNOSIS — M54.9 UPPER BACK PAIN ON RIGHT SIDE: ICD-10-CM

## 2020-09-14 DIAGNOSIS — M89.8X1 CLAVICLE PAIN: Primary | ICD-10-CM

## 2020-09-14 DIAGNOSIS — M54.17 LUMBOSACRAL RADICULOPATHY: ICD-10-CM

## 2020-09-14 DIAGNOSIS — M89.8X1 CLAVICLE PAIN: ICD-10-CM

## 2020-09-14 DIAGNOSIS — L72.3 SEBACEOUS CYST: ICD-10-CM

## 2020-09-14 DIAGNOSIS — M25.569 KNEE PAIN, UNSPECIFIED CHRONICITY, UNSPECIFIED LATERALITY: ICD-10-CM

## 2020-09-14 LAB
ALBUMIN SERPL-MCNC: 3.9 G/DL (ref 3.4–5)
ALP SERPL-CCNC: 121 U/L (ref 40–150)
ALT SERPL W P-5'-P-CCNC: 29 U/L (ref 0–50)
ANION GAP SERPL CALCULATED.3IONS-SCNC: 4 MMOL/L (ref 3–14)
AST SERPL W P-5'-P-CCNC: 15 U/L (ref 0–45)
BASOPHILS # BLD AUTO: 0 10E9/L (ref 0–0.2)
BASOPHILS NFR BLD AUTO: 0.5 %
BILIRUB SERPL-MCNC: 0.4 MG/DL (ref 0.2–1.3)
BUN SERPL-MCNC: 11 MG/DL (ref 7–30)
CALCIUM SERPL-MCNC: 9.3 MG/DL (ref 8.5–10.1)
CHLORIDE SERPL-SCNC: 107 MMOL/L (ref 94–109)
CO2 SERPL-SCNC: 27 MMOL/L (ref 20–32)
CREAT SERPL-MCNC: 0.79 MG/DL (ref 0.52–1.04)
DIFFERENTIAL METHOD BLD: NORMAL
EOSINOPHIL # BLD AUTO: 0.1 10E9/L (ref 0–0.7)
EOSINOPHIL NFR BLD AUTO: 1.3 %
ERYTHROCYTE [DISTWIDTH] IN BLOOD BY AUTOMATED COUNT: 12.2 % (ref 10–15)
GFR SERPL CREATININE-BSD FRML MDRD: >90 ML/MIN/{1.73_M2}
GLUCOSE SERPL-MCNC: 90 MG/DL (ref 70–99)
HCT VFR BLD AUTO: 45.2 % (ref 35–47)
HGB BLD-MCNC: 15.1 G/DL (ref 11.7–15.7)
IMM GRANULOCYTES # BLD: 0 10E9/L (ref 0–0.4)
IMM GRANULOCYTES NFR BLD: 0.2 %
LYMPHOCYTES # BLD AUTO: 2.1 10E9/L (ref 0.8–5.3)
LYMPHOCYTES NFR BLD AUTO: 34.3 %
MCH RBC QN AUTO: 29.4 PG (ref 26.5–33)
MCHC RBC AUTO-ENTMCNC: 33.4 G/DL (ref 31.5–36.5)
MCV RBC AUTO: 88 FL (ref 78–100)
MONOCYTES # BLD AUTO: 0.4 10E9/L (ref 0–1.3)
MONOCYTES NFR BLD AUTO: 6.9 %
NEUTROPHILS # BLD AUTO: 3.5 10E9/L (ref 1.6–8.3)
NEUTROPHILS NFR BLD AUTO: 56.8 %
NRBC # BLD AUTO: 0 10*3/UL
NRBC BLD AUTO-RTO: 0 /100
PLATELET # BLD AUTO: 329 10E9/L (ref 150–450)
POTASSIUM SERPL-SCNC: 4 MMOL/L (ref 3.4–5.3)
PROT SERPL-MCNC: 8.3 G/DL (ref 6.8–8.8)
RBC # BLD AUTO: 5.14 10E12/L (ref 3.8–5.2)
SODIUM SERPL-SCNC: 138 MMOL/L (ref 133–144)
WBC # BLD AUTO: 6.1 10E9/L (ref 4–11)

## 2020-09-14 PROCEDURE — 36415 COLL VENOUS BLD VENIPUNCTURE: CPT | Mod: ZL | Performed by: FAMILY MEDICINE

## 2020-09-14 PROCEDURE — 73000 X-RAY EXAM OF COLLAR BONE: CPT | Mod: TC,LT

## 2020-09-14 PROCEDURE — 99214 OFFICE O/P EST MOD 30 MIN: CPT | Performed by: FAMILY MEDICINE

## 2020-09-14 PROCEDURE — 73000 X-RAY EXAM OF COLLAR BONE: CPT | Mod: TC,50

## 2020-09-14 PROCEDURE — 85025 COMPLETE CBC W/AUTO DIFF WBC: CPT | Mod: ZL | Performed by: FAMILY MEDICINE

## 2020-09-14 PROCEDURE — 80053 COMPREHEN METABOLIC PANEL: CPT | Mod: ZL | Performed by: FAMILY MEDICINE

## 2020-09-14 PROCEDURE — G0463 HOSPITAL OUTPT CLINIC VISIT: HCPCS

## 2020-09-14 RX ORDER — IBUPROFEN 800 MG/1
800 TABLET, FILM COATED ORAL EVERY 8 HOURS PRN
Qty: 60 TABLET | Refills: 2 | Status: SHIPPED | OUTPATIENT
Start: 2020-09-14 | End: 2021-07-28

## 2020-09-14 ASSESSMENT — MIFFLIN-ST. JEOR: SCORE: 1723.33

## 2020-09-14 ASSESSMENT — PAIN SCALES - GENERAL: PAINLEVEL: NO PAIN (0)

## 2020-09-14 NOTE — NURSING NOTE
"Chief Complaint   Patient presents with     Musculoskeletal Problem       Initial /68 (BP Location: Right arm, Patient Position: Chair, Cuff Size: Adult Large)   Pulse 66   Temp 97.6  F (36.4  C) (Tympanic)   Resp 16   Ht 1.651 m (5' 5\")   Wt 100.2 kg (221 lb)   SpO2 98%   BMI 36.78 kg/m   Estimated body mass index is 36.78 kg/m  as calculated from the following:    Height as of this encounter: 1.651 m (5' 5\").    Weight as of this encounter: 100.2 kg (221 lb).  Medication Reconciliation: complete  Sharyn Mueller LPN    "

## 2020-09-14 NOTE — PATIENT INSTRUCTIONS
Will call with xray results.  Labs normal.  Ibuprofen refilled.  PT referral.  If left sided cyst continues to enlarge or becomes symptomatic, call for referral to surgery.

## 2020-09-16 ENCOUNTER — ALLIED HEALTH/NURSE VISIT (OUTPATIENT)
Dept: ALLERGY | Facility: OTHER | Age: 30
End: 2020-09-16
Attending: FAMILY MEDICINE
Payer: COMMERCIAL

## 2020-09-16 DIAGNOSIS — Z30.42 ENCOUNTER FOR SURVEILLANCE OF INJECTABLE CONTRACEPTIVE: Primary | ICD-10-CM

## 2020-09-16 PROCEDURE — 96372 THER/PROPH/DIAG INJ SC/IM: CPT

## 2020-09-16 RX ADMIN — MEDROXYPROGESTERONE ACETATE 150 MG: 150 INJECTION, SUSPENSION INTRAMUSCULAR at 13:40

## 2020-09-16 NOTE — PROGRESS NOTES
Clinic Administered Medication Documentation      Depo Provera Documentation    URINE HCG: not indicated    Depo-Provera Standing Order inclusion/exclusion criteria reviewed.   Patient meets: inclusion criteria     BP: Data Unavailable  LAST PAP/EXAM: No results found for: PAP    Prior to injection, verified patient identity using patient's name and date of birth. Medication was administered. Please see MAR and medication order for additional information.     Was entire vial of medication used? Yes  Vial/Syringe: Single dose vial  Expiration Date:  11/2021    Patient instructed to stay in clinic after the injection but patient declined.  NEXT INJECTION DUE: 12/2/20 - 12/16/20    Given in left glut      OH CHAUHAN LPN

## 2020-09-17 ENCOUNTER — HOSPITAL ENCOUNTER (OUTPATIENT)
Dept: PHYSICAL THERAPY | Facility: HOSPITAL | Age: 30
Setting detail: THERAPIES SERIES
End: 2020-09-17
Attending: FAMILY MEDICINE
Payer: COMMERCIAL

## 2020-09-17 DIAGNOSIS — M54.9 UPPER BACK PAIN ON RIGHT SIDE: ICD-10-CM

## 2020-09-17 DIAGNOSIS — M54.17 LUMBOSACRAL RADICULOPATHY: ICD-10-CM

## 2020-09-17 DIAGNOSIS — M89.8X1 CLAVICLE PAIN: ICD-10-CM

## 2020-09-17 DIAGNOSIS — M25.569 KNEE PAIN, UNSPECIFIED CHRONICITY, UNSPECIFIED LATERALITY: ICD-10-CM

## 2020-09-17 PROCEDURE — 97110 THERAPEUTIC EXERCISES: CPT | Mod: GP

## 2020-09-17 PROCEDURE — 97161 PT EVAL LOW COMPLEX 20 MIN: CPT | Mod: GP

## 2020-09-17 NOTE — PROGRESS NOTES
Initial Physical Therapy Evaluations       Name: Yulissa Grande MRN# [id number]   Age: 30 year old YOB: 1990     Date of Consultation: September 17, 2020  Primary care provider: Olivia Jj    Referring Physician: Olivia Jj MD  Orders: Eval and Treat  Medical Diagnosis: Low Back Pain   Onset of Illness/Injury: 9/17/2020    Reason for PT Visit: Patient presents w/ R hip pain, knee, and ankle pressure.  Notes she is not having much back pain, but she is having numbness in her R leg and numbness in her R arm.  Patient has always had knee pain since 8th grade, but has failed to progress.  Patient is concerned about this numbness in her leg and arm.  History of falls at certain times.  Patient notes extending her leg out will cause her whole hip down her leg to go numb.  Patient gets only numbness on the R leg, but not pain.  Patient notes that at times her R arm will go numb especially raising her arm overhead.  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   Prior Level of Function: Independent      Community Support/Living Environment/Employment History: not currently working, stay at home mom, Care for 6 kids at home.      Patient/Family Goal: To     Fall Screen:   Have you fallen 2 or more times in the last year? No  Have you fallen and had an injury in the last year? No  Timed up & go: n/a  Is patient a fall risk? No    Past Medical History:   No past medical history on file.    Past Surgical History:  No past surgical history on file.    Medications:   Current Outpatient Medications   Medication Sig     ibuprofen (ADVIL/MOTRIN) 800 MG tablet Take 1 tablet (800 mg) by mouth every 8 hours as needed for moderate pain     medroxyPROGESTERone (DEPO-PROVERA) 150 MG/ML injection Inject 1 mL (150 mg) into the muscle every 3 months     Current Facility-Administered Medications   Medication     medroxyPROGESTERone (DEPO-PROVERA) injection  150 mg       Imaging:     Musculoskeletal Findings:   Patient goals: to improve hip, back, knee pain     OBJECTIVE  Pain at rest: 3/10  Pain with activity: 3/10  Aggravating Factors: Standing, walking, running,   Relieving Factors: laying down or resting        Pain with coughing: No  Pain with sneezing: No  Pain with straining: No  Change in bowel/bladder: No  Numbness or tingling in groin area: No    Palpation: tenderness over PSIS B as well as lower lumbar spine.  Most painful over piriformis and B glute meds     Gait: Normal    Assistive devices:      Dermatomes:   Right lower extremity: negative  Left lower extremity: negative     Range of Motion:  Active Lumbar Spine       Flexion: WNL - does not increase pain       Extension: WNL - Slight pain at end range        Right sidebend: WNL       Left sidebend: WNL       Right rotation:        Left rotation:      Right Lower Extremity Range of Motion: within normal limits   Left Lower Extremity Range of Motion: within normal limits     Strength:  Abdominal Strength: 3/5  Right Lower Extremity Strength: 5/5   Left Lower Extremity Strength: 5/5      Special Tests:  Spine Special Tests:  Slump:  Left: positive              Right: positive - numbness/tingling  Straight Leg Raise:           Left:       Right: increased pain w/ R SLR   Traction:              Left:       Right: improved R hip pain  Prone Knee Bend:            Left:       Right: increased R Hip pain  Compression:    Left:       Right:  Repeated Movement Testing:  increased discomfort w/ femoral and sciatic nerve glides  Passive Intervertebral Accessory Movements: increased pain in lower lumbar spine w/ PA mobilization  Prone Instability Test:         Sacroiliac Special Tests:  Sacroiliac Joint Stress:    neg  Sacroiliac Joint Fixation:   Sacroiliac Gapping:            Posterior Compression: neg  Apical Pressure: neg     Hip Special Tests:                  Right      Left          RICHIE:   neg                  Scour Test: neg                   Herber Test:                       Leg Length:                           Trendelenburg s Sign:                         Hip PROM:     L LE: WFLs     R LE: WFLs     Patient's Concordant Sign: standing, raising arm overhead, activity, walking extended periods    Assessment:  Patient presents w/ numbness and pain in R hip.  Numbness follows a mostly lateral distribution not described as burning or tingling, this change initiate following birth of her child 7 years prior in which she had rapid weight gain that was never lost.  Sensitivity in the inguinal grooves leads me to believe that there is femoral cutaneous nerve involvement.  Alternatively, sciatic nerve tension tests reproduces pain and numbness in leg, but does not reproduce pain in her back.  Pressure over piriformis and glute med also reproduced sciatic distribution and numbness/pain.  Plan to start patient on nerve glides and further work on some zach extension if current plan doesn't improve numbness or pain.  Only minimally examined upper extremity today, but patient exhibits thoracic outlet like symptoms of R arm w/ any overhead activity, and chest compression, or arm compression causing numbness/tingling down the R arm that resolves w/ positional changes.  Denies significant pain in these regions, though concerns of numbness driving visit to PT.  History of knee pain w/ medial joint line knee pain as well as crepitus with meniscal assessment.  Chronic injury going back to 8th grade.  Will benefit from strengthening once assessing if we can change numbness/tingling.           Outcome Measures:   SPADI - 9     Goals: 4-8 weeks  1. Patient will be consistent and compliant in HEP to assess effectiveness of technique   2. Patient will improve numbness and tingling in R lower extremity and upper extremity to improve quality of life.   3. Patient will increase walking distance to 1 mile w/o increase in low back pain or  numbness.   4. Patient will demonstrate self management techniques to reduce numbness associated w/ UE to reduce symptoms    Planned Interventions: therapeutic exercise, manual therapy, mechanical traction, dry needling    Clinical Impressions:  Criteria for Skilled Therapeutic Intervention Met: Yes  PT Diagnosis: Low back pain w/ radiculopathy   Influenced by the following impairments: lateral hip and leg numbness and tingling, R shoulder numbness and tingling, neck pain  Functional limitations due to impairment: walking, using arm for repetivie activity, overhead activities, sitting extended periods  Clinical presentation: Stable/Uncomplicated  Clinical presentation rationale: therapist discretion  Clinical Decision making (complexity): Low Complexity  Predicted Duration of Therapy Intervention (days/wks): 2x  Risks and Benefits of therapy have been explained: Yes  Patient, Family & other staff in agreement with plan of care: Yes  Comments: complicated history.  Will start with emphasis on lower extremity and progress to upper extremity symptoms as patient has greater c/o LE symptoms.  Long assessment written above    Total Evaluation Time: 15

## 2020-09-24 ENCOUNTER — HOSPITAL ENCOUNTER (OUTPATIENT)
Dept: PHYSICAL THERAPY | Facility: HOSPITAL | Age: 30
Setting detail: THERAPIES SERIES
End: 2020-09-24
Attending: FAMILY MEDICINE
Payer: COMMERCIAL

## 2020-09-24 PROCEDURE — 97110 THERAPEUTIC EXERCISES: CPT | Mod: GP

## 2020-10-01 ENCOUNTER — HOSPITAL ENCOUNTER (OUTPATIENT)
Dept: PHYSICAL THERAPY | Facility: HOSPITAL | Age: 30
Setting detail: THERAPIES SERIES
End: 2020-10-01
Attending: FAMILY MEDICINE
Payer: COMMERCIAL

## 2020-10-01 PROCEDURE — 97110 THERAPEUTIC EXERCISES: CPT | Mod: GP

## 2020-10-08 ENCOUNTER — HOSPITAL ENCOUNTER (OUTPATIENT)
Dept: PHYSICAL THERAPY | Facility: HOSPITAL | Age: 30
Setting detail: THERAPIES SERIES
End: 2020-10-08
Attending: FAMILY MEDICINE
Payer: COMMERCIAL

## 2020-10-08 PROCEDURE — 97110 THERAPEUTIC EXERCISES: CPT | Mod: GP

## 2020-10-22 ENCOUNTER — HOSPITAL ENCOUNTER (OUTPATIENT)
Dept: PHYSICAL THERAPY | Facility: HOSPITAL | Age: 30
Setting detail: THERAPIES SERIES
End: 2020-10-22
Attending: FAMILY MEDICINE
Payer: COMMERCIAL

## 2020-10-22 PROCEDURE — 97140 MANUAL THERAPY 1/> REGIONS: CPT | Mod: GP

## 2020-10-22 PROCEDURE — 97110 THERAPEUTIC EXERCISES: CPT | Mod: GP

## 2020-10-29 ENCOUNTER — HOSPITAL ENCOUNTER (OUTPATIENT)
Dept: PHYSICAL THERAPY | Facility: HOSPITAL | Age: 30
Setting detail: THERAPIES SERIES
End: 2020-10-29
Attending: FAMILY MEDICINE
Payer: COMMERCIAL

## 2020-10-29 PROCEDURE — 97110 THERAPEUTIC EXERCISES: CPT | Mod: GP

## 2020-12-15 ENCOUNTER — ALLIED HEALTH/NURSE VISIT (OUTPATIENT)
Dept: ALLERGY | Facility: OTHER | Age: 30
End: 2020-12-15
Attending: FAMILY MEDICINE
Payer: COMMERCIAL

## 2020-12-15 DIAGNOSIS — Z30.42 ENCOUNTER FOR SURVEILLANCE OF INJECTABLE CONTRACEPTIVE: Primary | ICD-10-CM

## 2020-12-15 PROCEDURE — 96372 THER/PROPH/DIAG INJ SC/IM: CPT

## 2020-12-15 RX ORDER — MEDROXYPROGESTERONE ACETATE 150 MG/ML
150 INJECTION, SUSPENSION INTRAMUSCULAR
Status: COMPLETED | OUTPATIENT
Start: 2020-12-15 | End: 2021-08-10

## 2020-12-15 RX ADMIN — MEDROXYPROGESTERONE ACETATE 150 MG: 150 INJECTION, SUSPENSION INTRAMUSCULAR at 16:33

## 2020-12-15 NOTE — PROGRESS NOTES
BP: Data Unavailable  Verified patient identity using name and  prior to injcetion    LAST PAP/EXAM: No results found for: PAP  URINE HCG:not indicated    The following medication was given:     MEDICATION: Depo Provera 150mg  ROUTE: IM  SITE: LUQ - Gluteus  : [employer]  LOT #: [id number]  EXP:2021  NEXT INJECTION DUE: 3/2/21 - 3/16/21   Provider: Dr. ASHLIE George RN

## 2020-12-20 ENCOUNTER — HEALTH MAINTENANCE LETTER (OUTPATIENT)
Age: 30
End: 2020-12-20

## 2020-12-31 ENCOUNTER — TELEPHONE (OUTPATIENT)
Dept: FAMILY MEDICINE | Facility: OTHER | Age: 30
End: 2020-12-31

## 2020-12-31 NOTE — TELEPHONE ENCOUNTER
I do not prescribe medications for weight loss.  She can be scheduled with Janeth Krishnamurthy, for medical management of weight loss.  Or she can be referred to weight loss clinic - bariatric clinic - [place] or [place].

## 2020-12-31 NOTE — TELEPHONE ENCOUNTER
Pt calling and would like medication for wt loss. Does she need an appt? What type?     Call back [phone]      Rehana Cook RN

## 2021-01-12 NOTE — TELEPHONE ENCOUNTER
Patient called back and was notified of provider's note. Patient was transferred to scheduling to schedule an appointment with Janeth Krishnamurthy

## 2021-01-20 NOTE — PROGRESS NOTES
"Yulissa Grande is presenting for evaluation of medical weight management. She has had weight problems for about 7 years, ever since having children. She had trouble losing the baby weight. Maximum weight is 224 pounds (current weight). She would like to lose 96 pounds. Goal weight is 130 pounds. Other methods the patient has tried to lose weight include detox cabbage soup, keto, and exercise.     Processed Foods: rare  Alcohol: rare  Soda/pop: 2 mellow yellows per week  Meals Prepared by: self  Psychologic issues: none  Family Support: great-significant other, he also needs to lose weight  Activity/Exercise: daily; skating or skiing, walking  Insight/Motivation: 10/10; wants to be healthy, does not want this pressure put on weights  Sleep habits: good, does not snore  Cravings: none  Appetite: good  Medications contributing to weight issues: depo, wants to continue, does not feel she gained weight with her depo    Co-morbidities   There is no problem list on file for this patient.       Meds:     Current Outpatient Medications   Medication     ibuprofen (ADVIL/MOTRIN) 800 MG tablet     medroxyPROGESTERone (DEPO-PROVERA) 150 MG/ML injection     Current Facility-Administered Medications   Medication     medroxyPROGESTERone (DEPO-PROVERA) injection 150 mg     medroxyPROGESTERone (DEPO-PROVERA) injection 150 mg       No current facility-administered medications for this visit.     Current Eating Patterns:   Generally 3 meals per day.   Breakfast: eggs, omelets, coffee with brown sugar and milk  Midmorning snack: none  Lunch: often skips,   Mid afternoon snack: veggies, crackers, cereal with whole milk  Supper: meat and potatoes, pasta, rice, pizza, chicken , tacos  Grazing: some after dinner, crackers, dips    OBJECTIVE:   /72 (BP Location: Right arm, Patient Position: Chair, Cuff Size: Adult Large)   Pulse 68   Ht 1.651 m (5' 5\")   Wt 101.8 kg (224 lb 8 oz)   SpO2 99%   BMI 37.36 kg/m      Physical Exam "   Exam:  Constitutional: alert, no distress and overweight  Cardiovascular: RRR. No murmurs, clicks gallops or rub  Respiratory: negative, Lungs clear  Psychiatric: mentation appears normal and affect normal/bright    ASSESSMENT:   -Obesity Stage 2. Body mass index is 37.36.   -Glucose was 90 on 9/14/20. Weight gain since having children.   -No recent TSH or vit D. Will check. Will notify patient of the results when available and intervene accordingly.   -Eating many carbs for dinner.       Protein Goal: 70-90 grams  Carb Limit: less than 50 grams  Fat Goal: 60-70% of calories  Calorie Goal: 1200    PLAN: Initiated a low carb diet. Will avoid pasta, rice, and soda. Went over protein/carbohydrate/fat recommendations. Reminded patient to focus on getting appropriate amounts of protein. Discussed the rational for eating higher fat. This equates to around 60- 70% fat. Limit or avoid sugar, starches, bread products, rice and cereal, most grains and simple carbohydrates. Goal set for 6 lb weight loss over the next 4 weeks. F/U in 4 weeks with myself.

## 2021-01-25 ENCOUNTER — OFFICE VISIT (OUTPATIENT)
Dept: FAMILY MEDICINE | Facility: OTHER | Age: 31
End: 2021-01-25
Attending: NURSE PRACTITIONER
Payer: COMMERCIAL

## 2021-01-25 VITALS
HEIGHT: 65 IN | HEART RATE: 68 BPM | DIASTOLIC BLOOD PRESSURE: 72 MMHG | BODY MASS INDEX: 37.4 KG/M2 | WEIGHT: 224.5 LBS | SYSTOLIC BLOOD PRESSURE: 124 MMHG | OXYGEN SATURATION: 99 %

## 2021-01-25 DIAGNOSIS — Z13.29 SCREENING FOR THYROID DISORDER: ICD-10-CM

## 2021-01-25 DIAGNOSIS — Z13.21 ENCOUNTER FOR VITAMIN DEFICIENCY SCREENING: ICD-10-CM

## 2021-01-25 DIAGNOSIS — E66.812 OBESITY, CLASS II, BMI 35-39.9, NO COMORBIDITY: Primary | ICD-10-CM

## 2021-01-25 DIAGNOSIS — R63.5 WEIGHT GAIN: ICD-10-CM

## 2021-01-25 LAB — TSH SERPL DL<=0.005 MIU/L-ACNC: 0.82 MU/L (ref 0.4–4)

## 2021-01-25 PROCEDURE — 84443 ASSAY THYROID STIM HORMONE: CPT | Mod: ZL | Performed by: NURSE PRACTITIONER

## 2021-01-25 PROCEDURE — G0463 HOSPITAL OUTPT CLINIC VISIT: HCPCS | Mod: 25

## 2021-01-25 PROCEDURE — 82306 VITAMIN D 25 HYDROXY: CPT | Mod: ZL | Performed by: NURSE PRACTITIONER

## 2021-01-25 PROCEDURE — 99213 OFFICE O/P EST LOW 20 MIN: CPT | Performed by: NURSE PRACTITIONER

## 2021-01-25 PROCEDURE — 36415 COLL VENOUS BLD VENIPUNCTURE: CPT | Mod: ZL | Performed by: NURSE PRACTITIONER

## 2021-01-25 ASSESSMENT — PAIN SCALES - GENERAL: PAINLEVEL: NO PAIN (0)

## 2021-01-25 ASSESSMENT — MIFFLIN-ST. JEOR: SCORE: 1739.21

## 2021-01-25 NOTE — NURSING NOTE
"Chief Complaint   Patient presents with     Weight Problem     weight management consult        Initial /72 (BP Location: Right arm, Patient Position: Chair, Cuff Size: Adult Large)   Pulse 68   Ht 1.651 m (5' 5\")   Wt 101.8 kg (224 lb 8 oz)   SpO2 99%   BMI 37.36 kg/m   Estimated body mass index is 37.36 kg/m  as calculated from the following:    Height as of this encounter: 1.651 m (5' 5\").    Weight as of this encounter: 101.8 kg (224 lb 8 oz).  Medication Reconciliation: complete  Marcia Mason LPN  "

## 2021-01-26 LAB — DEPRECATED CALCIDIOL+CALCIFEROL SERPL-MC: 23 UG/L (ref 20–75)

## 2021-02-19 NOTE — PROGRESS NOTES
"Yulissa Breaux is presenting for follow up of medical weight management. She was last seen on 1/25/21. Weight at this time was 224 pounds (Initial Weight). Weight today is 218 lb. Down 6 pounds over the past 4 weeks. Overall, she feels the same.   Energy levels are about the same.   Perceived compliance with lifestyle changes has been good.   Appetite/Hunger issues: controlled  Goal: 130 pounds.   New issues: none.   ?  Current Eating Patterns: good. Generally 3 meals per day.  Breakfast: eggs and avacoda with venison sausage    Lunch: veggies   Supper: meat and veggies, wild rice  Snacks: veggies  Grazing: rare  Processed Foods: rare   Alcohol: none   Wasted Calories: rare   Supplements: multivitamin and vit D   Meals Prepared by: self  Nutrition: low carb, high protein, high carb  ?  Diet diary reviewed and estimated daily caloric intake is [id number].  ?  Activity/Exercise: Compliance has been daily, weight lifting and cross country skiing daily. Willing to continue.    Co-morbidities:   There is no problem list on file for this patient.    Meds:   Current Outpatient Medications   Medication     ibuprofen (ADVIL/MOTRIN) 800 MG tablet     medroxyPROGESTERone (DEPO-PROVERA) 150 MG/ML injection     Current Facility-Administered Medications   Medication     medroxyPROGESTERone (DEPO-PROVERA) injection 150 mg     medroxyPROGESTERone (DEPO-PROVERA) injection 150 mg     Psychologic issues/Triggers: well controlled  ?  Family Support: good-   ?  Insight/Motivation: good, wanting to get healthy   ?  OBJECTIVE:   /80 (BP Location: Right arm, Patient Position: Sitting, Cuff Size: Adult Large)   Pulse 70   Temp 99.3  F (37.4  C) (Tympanic)   Ht 1.651 m (5' 5\")   Wt 98.1 kg (216 lb 4.8 oz)   SpO2 98%   BMI 35.99 kg/m       Obesity Stage 2  Down 6 pounds over the past 4 weeks  ?  ASSESSMENT:   -Down 6 pounds since starting on 1/25/21.   -Glucose 90 on 9/14/20.   -TSH normal on 1/25/21.   -Vit D was low at " 23, Taking 3000 units vit D3 daily. Will recheck in 4 weeks.   -She is taking depo for contraception, does not feel this has caused weight gain in the past.    Thigh-71 cm  Hip-127 cm  Waist-104 cm  ?  PLAN:  Discussed in detail and reinforced continued dietary and activity modifications including diet and exercise logs. New goal set for 6# weight loss over the next 4 weeks. F/U in 4 weeks.

## 2021-02-22 ENCOUNTER — OFFICE VISIT (OUTPATIENT)
Dept: FAMILY MEDICINE | Facility: OTHER | Age: 31
End: 2021-02-22
Attending: NURSE PRACTITIONER
Payer: COMMERCIAL

## 2021-02-22 VITALS
TEMPERATURE: 99.3 F | OXYGEN SATURATION: 98 % | DIASTOLIC BLOOD PRESSURE: 80 MMHG | HEART RATE: 70 BPM | BODY MASS INDEX: 36.04 KG/M2 | SYSTOLIC BLOOD PRESSURE: 118 MMHG | HEIGHT: 65 IN | WEIGHT: 216.3 LBS

## 2021-02-22 DIAGNOSIS — E66.812 OBESITY, CLASS II, BMI 35-39.9, NO COMORBIDITY: Primary | ICD-10-CM

## 2021-02-22 DIAGNOSIS — E55.9 VITAMIN D DEFICIENCY: ICD-10-CM

## 2021-02-22 DIAGNOSIS — Z30.42 ENCOUNTER FOR SURVEILLANCE OF INJECTABLE CONTRACEPTIVE: ICD-10-CM

## 2021-02-22 PROCEDURE — G0463 HOSPITAL OUTPT CLINIC VISIT: HCPCS | Mod: 25

## 2021-02-22 PROCEDURE — 99213 OFFICE O/P EST LOW 20 MIN: CPT | Performed by: NURSE PRACTITIONER

## 2021-02-22 PROCEDURE — G0463 HOSPITAL OUTPT CLINIC VISIT: HCPCS

## 2021-02-22 ASSESSMENT — MIFFLIN-ST. JEOR: SCORE: 1702.01

## 2021-02-22 ASSESSMENT — PAIN SCALES - GENERAL: PAINLEVEL: NO PAIN (0)

## 2021-02-22 NOTE — NURSING NOTE
"Chief Complaint   Patient presents with     Weight Problem       Initial /80 (BP Location: Right arm, Patient Position: Sitting, Cuff Size: Adult Large)   Pulse 70   Temp 99.3  F (37.4  C) (Tympanic)   Ht 1.651 m (5' 5\")   Wt 98.1 kg (216 lb 4.8 oz)   SpO2 98%   BMI 35.99 kg/m   Estimated body mass index is 35.99 kg/m  as calculated from the following:    Height as of this encounter: 1.651 m (5' 5\").    Weight as of this encounter: 98.1 kg (216 lb 4.8 oz).  Medication Reconciliation: complete  Raquel Morin LPN  "

## 2021-03-02 ENCOUNTER — ALLIED HEALTH/NURSE VISIT (OUTPATIENT)
Dept: ALLERGY | Facility: OTHER | Age: 31
End: 2021-03-02
Attending: FAMILY MEDICINE
Payer: COMMERCIAL

## 2021-03-02 DIAGNOSIS — Z30.42 ENCOUNTER FOR SURVEILLANCE OF INJECTABLE CONTRACEPTIVE: Primary | ICD-10-CM

## 2021-03-02 PROCEDURE — 96372 THER/PROPH/DIAG INJ SC/IM: CPT

## 2021-03-02 RX ADMIN — MEDROXYPROGESTERONE ACETATE 150 MG: 150 INJECTION, SUSPENSION INTRAMUSCULAR at 10:38

## 2021-03-02 NOTE — PROGRESS NOTES
Clinic Administered Medication Documentation      Depo Provera Documentation    URINE HCG: not indicated    Depo-Provera Standing Order inclusion/exclusion criteria reviewed.   Patient meets: inclusion criteria     BP: Data Unavailable  LAST PAP/EXAM: No results found for: PAP    Prior to injection, verified patient identity using patient's name and date of birth. Medication was administered. Please see MAR and medication order for additional information.     Was entire vial of medication used? Yes  Vial/Syringe: Single dose vial  Expiration Date:  08/2022    Patient instructed to report any adverse reaction to staff immediately .  NEXT INJECTION DUE: 5/18/21 - 6/1/21    Rt EMERALD Fernandez RN on 3/2/2021 at 10:40 AM

## 2021-05-25 ENCOUNTER — ALLIED HEALTH/NURSE VISIT (OUTPATIENT)
Dept: ALLERGY | Facility: OTHER | Age: 31
End: 2021-05-25
Attending: FAMILY MEDICINE
Payer: COMMERCIAL

## 2021-05-25 DIAGNOSIS — Z30.42 ENCOUNTER FOR SURVEILLANCE OF INJECTABLE CONTRACEPTIVE: Primary | ICD-10-CM

## 2021-05-25 PROCEDURE — 96372 THER/PROPH/DIAG INJ SC/IM: CPT

## 2021-05-25 RX ADMIN — MEDROXYPROGESTERONE ACETATE 150 MG: 150 INJECTION, SUSPENSION INTRAMUSCULAR at 11:08

## 2021-05-25 NOTE — PROGRESS NOTES
Clinic Administered Medication Documentation      Depo Provera Documentation    URINE HCG: not indicated    Depo-Provera Standing Order inclusion/exclusion criteria reviewed.   Patient meets: inclusion criteria     BP: Data Unavailable  LAST PAP/EXAM: No results found for: PAP    Prior to injection, verified patient identity using patient's name and date of birth. Medication was administered. Please see MAR and medication order for additional information.     Was entire vial of medication used? Yes  Vial/Syringe: Single dose vial  Expiration Date:  02/2023    Patient instructed to report any adverse reaction to staff immediately .  NEXT INJECTION DUE: 8/10/21 - 8/24/21    Given left upper, outer quadrant.    Alexus Deras, RN

## 2021-08-10 ENCOUNTER — ALLIED HEALTH/NURSE VISIT (OUTPATIENT)
Dept: ALLERGY | Facility: OTHER | Age: 31
End: 2021-08-10
Attending: FAMILY MEDICINE
Payer: COMMERCIAL

## 2021-08-10 DIAGNOSIS — Z30.42 ENCOUNTER FOR SURVEILLANCE OF INJECTABLE CONTRACEPTIVE: Primary | ICD-10-CM

## 2021-08-10 PROCEDURE — 96372 THER/PROPH/DIAG INJ SC/IM: CPT

## 2021-08-10 RX ADMIN — MEDROXYPROGESTERONE ACETATE 150 MG: 150 INJECTION, SUSPENSION INTRAMUSCULAR at 10:58

## 2021-08-10 NOTE — PROGRESS NOTES
Clinic Administered Medication Documentation    Administrations This Visit     medroxyPROGESTERone (DEPO-PROVERA) injection 150 mg     Admin Date  08/10/2021 Action  Given Dose  150 mg Route  Intramuscular Site  Right Upper Outer Quadrant Administered By  Alexus Deras RN    Ordering Provider: Samina Dow MD    Patient Supplied?: No                  Depo Provera Documentation    URINE HCG: not indicated    Depo-Provera Standing Order inclusion/exclusion criteria reviewed.   Patient meets: inclusion criteria     BP: Data Unavailable  LAST PAP/EXAM: No results found for: PAP    Prior to injection, verified patient identity using patient's name and date of birth. Medication was administered. Please see MAR and medication order for additional information.     Was entire vial of medication used? Yes  Vial/Syringe: Single dose vial  Expiration Date:  04/2023    Patient instructed to report any adverse reaction to staff immediately .  NEXT INJECTION DUE: 10/26/21 - 11/9/21    Alexus Deras RN

## 2021-09-23 DIAGNOSIS — M54.17 LUMBOSACRAL RADICULOPATHY: ICD-10-CM

## 2021-09-23 DIAGNOSIS — M25.569 KNEE PAIN, UNSPECIFIED CHRONICITY, UNSPECIFIED LATERALITY: ICD-10-CM

## 2021-09-23 DIAGNOSIS — M89.8X1 CLAVICLE PAIN: ICD-10-CM

## 2021-09-23 RX ORDER — IBUPROFEN 800 MG/1
TABLET, FILM COATED ORAL
Qty: 60 TABLET | Refills: 0 | Status: SHIPPED | OUTPATIENT
Start: 2021-09-23 | End: 2021-11-22

## 2021-09-23 NOTE — TELEPHONE ENCOUNTER
Ibuprofen       Last Written Prescription Date:  7/28/2021  Last Fill Quantity: 60,   # refills: 0  Last Office Visit: 3/22/2021  Future Office visit:

## 2021-10-03 ENCOUNTER — HEALTH MAINTENANCE LETTER (OUTPATIENT)
Age: 31
End: 2021-10-03

## 2021-10-28 ENCOUNTER — TELEPHONE (OUTPATIENT)
Dept: FAMILY MEDICINE | Facility: OTHER | Age: 31
End: 2021-10-28

## 2021-10-28 DIAGNOSIS — Z30.42 ENCOUNTER FOR SURVEILLANCE OF INJECTABLE CONTRACEPTIVE: Primary | ICD-10-CM

## 2021-10-28 DIAGNOSIS — M89.8X1 CLAVICLE PAIN: Primary | ICD-10-CM

## 2021-10-28 RX ORDER — MEDROXYPROGESTERONE ACETATE 150 MG/ML
150 INJECTION, SUSPENSION INTRAMUSCULAR
Status: COMPLETED | OUTPATIENT
Start: 2021-10-28 | End: 2022-01-20

## 2021-10-28 NOTE — TELEPHONE ENCOUNTER
Patient would like a referral for surgery for the bump on her collar bone removed.  States it is getting more painful and driving her crazy.

## 2021-10-28 NOTE — TELEPHONE ENCOUNTER
Patient is on the shot room schedule for a Depo-Provera injection on 21.  The current order has .  Her last office visit with Janeth Krishnamurthy CNP was 21 and her last office visit with  was 20.  New order pended for review and signature, thank you!    Dominguez Fernandez RN on 10/28/2021 at 9:29 AM

## 2021-11-01 ENCOUNTER — ALLIED HEALTH/NURSE VISIT (OUTPATIENT)
Dept: ALLERGY | Facility: OTHER | Age: 31
End: 2021-11-01
Attending: PHYSICAL MEDICINE & REHABILITATION
Payer: COMMERCIAL

## 2021-11-01 DIAGNOSIS — Z30.42 ENCOUNTER FOR SURVEILLANCE OF INJECTABLE CONTRACEPTIVE: Primary | ICD-10-CM

## 2021-11-01 PROCEDURE — 96372 THER/PROPH/DIAG INJ SC/IM: CPT

## 2021-11-01 RX ADMIN — MEDROXYPROGESTERONE ACETATE 150 MG: 150 INJECTION, SUSPENSION, EXTENDED RELEASE INTRAMUSCULAR at 08:15

## 2021-11-03 ENCOUNTER — MEDICAL CORRESPONDENCE (OUTPATIENT)
Dept: MRI IMAGING | Facility: HOSPITAL | Age: 31
End: 2021-11-03

## 2021-11-03 ENCOUNTER — TRANSFERRED RECORDS (OUTPATIENT)
Dept: HEALTH INFORMATION MANAGEMENT | Facility: CLINIC | Age: 31
End: 2021-11-03

## 2021-11-16 ENCOUNTER — HOSPITAL ENCOUNTER (EMERGENCY)
Facility: HOSPITAL | Age: 31
Discharge: HOME | End: 2021-11-16
Admitting: EMERGENCY MEDICINE
Payer: COMMERCIAL

## 2021-11-16 VITALS
TEMPERATURE: 99.7 F | OXYGEN SATURATION: 98 % | DIASTOLIC BLOOD PRESSURE: 98 MMHG | SYSTOLIC BLOOD PRESSURE: 151 MMHG | RESPIRATION RATE: 16 BRPM | HEART RATE: 89 BPM

## 2021-11-16 PROCEDURE — 999N000104 HC STATISTIC NO CHARGE

## 2021-11-17 ENCOUNTER — APPOINTMENT (OUTPATIENT)
Dept: ULTRASOUND IMAGING | Facility: HOSPITAL | Age: 31
End: 2021-11-17
Attending: EMERGENCY MEDICINE
Payer: COMMERCIAL

## 2021-11-17 ENCOUNTER — APPOINTMENT (OUTPATIENT)
Dept: CT IMAGING | Facility: HOSPITAL | Age: 31
End: 2021-11-17
Attending: EMERGENCY MEDICINE
Payer: COMMERCIAL

## 2021-11-17 ENCOUNTER — HOSPITAL ENCOUNTER (EMERGENCY)
Facility: HOSPITAL | Age: 31
Discharge: HOME | End: 2021-11-17
Attending: EMERGENCY MEDICINE | Admitting: EMERGENCY MEDICINE
Payer: COMMERCIAL

## 2021-11-17 ENCOUNTER — NURSE TRIAGE (OUTPATIENT)
Dept: FAMILY MEDICINE | Facility: OTHER | Age: 31
End: 2021-11-17

## 2021-11-17 VITALS
OXYGEN SATURATION: 100 % | TEMPERATURE: 99.9 F | SYSTOLIC BLOOD PRESSURE: 118 MMHG | DIASTOLIC BLOOD PRESSURE: 87 MMHG | RESPIRATION RATE: 16 BRPM | HEART RATE: 79 BPM

## 2021-11-17 DIAGNOSIS — R10.31 RIGHT LOWER QUADRANT PAIN: ICD-10-CM

## 2021-11-17 LAB
ALBUMIN SERPL-MCNC: 3.8 G/DL (ref 3.4–5)
ALBUMIN SERPL-MCNC: 3.8 G/DL (ref 3.4–5)
ALBUMIN UR-MCNC: NEGATIVE MG/DL
ALP SERPL-CCNC: 123 U/L (ref 40–150)
ALP SERPL-CCNC: 123 U/L (ref 40–150)
ALT SERPL W P-5'-P-CCNC: 41 U/L (ref 0–50)
ALT SERPL W P-5'-P-CCNC: 41 U/L (ref 0–50)
ANION GAP SERPL CALCULATED.3IONS-SCNC: 8 MMOL/L (ref 3–14)
APPEARANCE UR: CLEAR
AST SERPL W P-5'-P-CCNC: 21 U/L (ref 0–45)
AST SERPL W P-5'-P-CCNC: 21 U/L (ref 0–45)
B-HCG SERPL-ACNC: <1 IU/L (ref 0–5)
BASOPHILS # BLD AUTO: 0 10E3/UL (ref 0–0.2)
BASOPHILS NFR BLD AUTO: 0 %
BILIRUB DIRECT SERPL-MCNC: 0.1 MG/DL (ref 0–0.2)
BILIRUB SERPL-MCNC: 0.5 MG/DL (ref 0.2–1.3)
BILIRUB SERPL-MCNC: 0.5 MG/DL (ref 0.2–1.3)
BILIRUB UR QL STRIP: NEGATIVE
BUN SERPL-MCNC: 7 MG/DL (ref 7–30)
CALCIUM SERPL-MCNC: 9 MG/DL (ref 8.5–10.1)
CHLORIDE BLD-SCNC: 106 MMOL/L (ref 94–109)
CO2 SERPL-SCNC: 25 MMOL/L (ref 20–32)
COLOR UR AUTO: YELLOW
CREAT SERPL-MCNC: 0.81 MG/DL (ref 0.52–1.04)
EOSINOPHIL # BLD AUTO: 0 10E3/UL (ref 0–0.7)
EOSINOPHIL NFR BLD AUTO: 0 %
ERYTHROCYTE [DISTWIDTH] IN BLOOD BY AUTOMATED COUNT: 12.3 % (ref 10–15)
GFR SERPL CREATININE-BSD FRML MDRD: >90 ML/MIN/1.73M2
GLUCOSE BLD-MCNC: 94 MG/DL (ref 70–99)
GLUCOSE UR STRIP-MCNC: NEGATIVE MG/DL
HCG UR QL: NEGATIVE
HCT VFR BLD AUTO: 44.4 % (ref 35–47)
HGB BLD-MCNC: 14.7 G/DL (ref 11.7–15.7)
HGB UR QL STRIP: NEGATIVE
IMM GRANULOCYTES # BLD: 0 10E3/UL
IMM GRANULOCYTES NFR BLD: 0 %
INR PPP: 1.11 (ref 0.85–1.15)
KETONES UR STRIP-MCNC: NEGATIVE MG/DL
LACTATE SERPL-SCNC: 0.9 MMOL/L (ref 0.7–2)
LEUKOCYTE ESTERASE UR QL STRIP: NEGATIVE
LIPASE SERPL-CCNC: 88 U/L (ref 73–393)
LYMPHOCYTES # BLD AUTO: 1.7 10E3/UL (ref 0.8–5.3)
LYMPHOCYTES NFR BLD AUTO: 27 %
MCH RBC QN AUTO: 28.7 PG (ref 26.5–33)
MCHC RBC AUTO-ENTMCNC: 33.1 G/DL (ref 31.5–36.5)
MCV RBC AUTO: 87 FL (ref 78–100)
MONOCYTES # BLD AUTO: 0.5 10E3/UL (ref 0–1.3)
MONOCYTES NFR BLD AUTO: 8 %
MUCOUS THREADS #/AREA URNS LPF: PRESENT /LPF
NEUTROPHILS # BLD AUTO: 4 10E3/UL (ref 1.6–8.3)
NEUTROPHILS NFR BLD AUTO: 65 %
NITRATE UR QL: NEGATIVE
NRBC # BLD AUTO: 0 10E3/UL
NRBC BLD AUTO-RTO: 0 /100
PH UR STRIP: 5.5 [PH] (ref 4.7–8)
PLATELET # BLD AUTO: 323 10E3/UL (ref 150–450)
POTASSIUM BLD-SCNC: 3.4 MMOL/L (ref 3.4–5.3)
PROT SERPL-MCNC: 8.6 G/DL (ref 6.8–8.8)
PROT SERPL-MCNC: 8.6 G/DL (ref 6.8–8.8)
RBC # BLD AUTO: 5.12 10E6/UL (ref 3.8–5.2)
RBC URINE: 0 /HPF
SODIUM SERPL-SCNC: 139 MMOL/L (ref 133–144)
SP GR UR STRIP: 1.02 (ref 1–1.03)
SQUAMOUS EPITHELIAL: 4 /HPF
UROBILINOGEN UR STRIP-MCNC: NORMAL MG/DL
WBC # BLD AUTO: 6.3 10E3/UL (ref 4–11)
WBC URINE: 3 /HPF

## 2021-11-17 PROCEDURE — 83605 ASSAY OF LACTIC ACID: CPT | Performed by: EMERGENCY MEDICINE

## 2021-11-17 PROCEDURE — 84702 CHORIONIC GONADOTROPIN TEST: CPT | Performed by: EMERGENCY MEDICINE

## 2021-11-17 PROCEDURE — 74177 CT ABD & PELVIS W/CONTRAST: CPT

## 2021-11-17 PROCEDURE — 36415 COLL VENOUS BLD VENIPUNCTURE: CPT | Performed by: EMERGENCY MEDICINE

## 2021-11-17 PROCEDURE — 258N000003 HC RX IP 258 OP 636: Performed by: EMERGENCY MEDICINE

## 2021-11-17 PROCEDURE — 81025 URINE PREGNANCY TEST: CPT | Performed by: EMERGENCY MEDICINE

## 2021-11-17 PROCEDURE — 250N000011 HC RX IP 250 OP 636: Performed by: EMERGENCY MEDICINE

## 2021-11-17 PROCEDURE — 82040 ASSAY OF SERUM ALBUMIN: CPT | Performed by: EMERGENCY MEDICINE

## 2021-11-17 PROCEDURE — 81001 URINALYSIS AUTO W/SCOPE: CPT | Performed by: EMERGENCY MEDICINE

## 2021-11-17 PROCEDURE — 99284 EMERGENCY DEPT VISIT MOD MDM: CPT | Performed by: EMERGENCY MEDICINE

## 2021-11-17 PROCEDURE — 83690 ASSAY OF LIPASE: CPT | Performed by: EMERGENCY MEDICINE

## 2021-11-17 PROCEDURE — 76830 TRANSVAGINAL US NON-OB: CPT

## 2021-11-17 PROCEDURE — 85025 COMPLETE CBC W/AUTO DIFF WBC: CPT | Performed by: EMERGENCY MEDICINE

## 2021-11-17 PROCEDURE — 99285 EMERGENCY DEPT VISIT HI MDM: CPT | Mod: 25

## 2021-11-17 PROCEDURE — 85610 PROTHROMBIN TIME: CPT | Performed by: EMERGENCY MEDICINE

## 2021-11-17 RX ORDER — IOPAMIDOL 755 MG/ML
500 INJECTION, SOLUTION INTRAVASCULAR ONCE
Status: COMPLETED | OUTPATIENT
Start: 2021-11-17 | End: 2021-11-17

## 2021-11-17 RX ORDER — SODIUM CHLORIDE 9 MG/ML
INJECTION, SOLUTION INTRAVENOUS CONTINUOUS
Status: DISCONTINUED | OUTPATIENT
Start: 2021-11-17 | End: 2021-11-17 | Stop reason: HOSPADM

## 2021-11-17 RX ADMIN — SODIUM CHLORIDE 1000 ML: 9 INJECTION, SOLUTION INTRAVENOUS at 18:52

## 2021-11-17 RX ADMIN — IOPAMIDOL 106 ML: 755 INJECTION, SOLUTION INTRAVENOUS at 19:39

## 2021-11-17 NOTE — ED TRIAGE NOTES
1800 patient had severe abdominal pain from her belly button down to the right lower side. Hurts more with movement or pressure

## 2021-11-17 NOTE — ED TRIAGE NOTES
Patient presents with complaints of right sided abdominal pain. States it started yesterday evening and is a bit better, but not worse today. Did call triage and was advised to come back in as she left last night due to the wait.

## 2021-11-17 NOTE — TELEPHONE ENCOUNTER
"    Reason for Disposition    [1] SEVERE pain (e.g., excruciating) AND [2] present > 1 hour    Additional Information    Negative: Shock suspected (e.g., cold/pale/clammy skin, too weak to stand, low BP, rapid pulse)    Negative: Difficult to awaken or acting confused (e.g., disoriented, slurred speech)    Negative: Passed out (i.e., lost consciousness, collapsed and was not responding)    Negative: Sounds like a life-threatening emergency to the triager    Negative: Chest pain    Negative: Pain is mainly in upper abdomen  (if needed ask: \"is it mainly above the belly button?\")    Negative: Followed an abdomen (stomach) injury    Negative: [1] Abdominal pain AND [2] pregnant < 20 weeks    Negative: [1] Abdominal pain AND [2] pregnant > 20 weeks    Negative: [1] Abdominal pain AND [2] postpartum (from 0 to 6 weeks after delivery)    Answer Assessment - Initial Assessment Questions  1. LOCATION: \"Where does it hurt?\"       R side, lower  2. RADIATION: \"Does the pain shoot anywhere else?\" (e.g., chest, back)      no  3. ONSET: \"When did the pain begin?\" (e.g., minutes, hours or days ago)       yesterday  4. SUDDEN: \"Gradual or sudden onset?\"      sudden  5. PATTERN \"Does the pain come and go, or is it constant?\"     - If constant: \"Is it getting better, staying the same, or worsening?\"       (Note: Constant means the pain never goes away completely; most serious pain is constant and it progresses)      - If intermittent: \"How long does it last?\" \"Do you have pain now?\"      (Note: Intermittent means the pain goes away completely between bouts)      constant  6. SEVERITY: \"How bad is the pain?\"  (e.g., Scale 1-10; mild, moderate, or severe)    - MILD (1-3): doesn't interfere with normal activities, abdomen soft and not tender to touch     - MODERATE (4-7): interferes with normal activities or awakens from sleep, tender to touch     - SEVERE (8-10): excruciating pain, doubled over, unable to do any normal activities      " " Severe yesterday, 3-4/10 at this time  7. RECURRENT SYMPTOM: \"Have you ever had this type of abdominal pain before?\" If so, ask: \"When was the last time?\" and \"What happened that time?\"       no  8. CAUSE: \"What do you think is causing the abdominal pain?\"      Not sure  9. RELIEVING/AGGRAVATING FACTORS: \"What makes it better or worse?\" (e.g., movement, antacids, bowel movement)      Worse with movement  10. OTHER SYMPTOMS: \"Has there been any vomiting, diarrhea, constipation, or urine problems?\"        No nausea or vomiting today, nauseous yesterday due to pain, no diarrhea or constipation  11. PREGNANCY: \"Is there any chance you are pregnant?\" \"When was your last menstrual period?\"        no    Protocols used: ABDOMINAL PAIN - FEMALE-A-AH      "

## 2021-11-18 ENCOUNTER — ANESTHESIA EVENT (OUTPATIENT)
Dept: SURGERY | Facility: HOSPITAL | Age: 31
End: 2021-11-18
Payer: COMMERCIAL

## 2021-11-18 ENCOUNTER — HOSPITAL ENCOUNTER (OUTPATIENT)
Facility: HOSPITAL | Age: 31
Discharge: HOME | End: 2021-11-18
Attending: SURGERY | Admitting: SURGERY
Payer: COMMERCIAL

## 2021-11-18 ENCOUNTER — OFFICE VISIT (OUTPATIENT)
Dept: SURGERY | Facility: OTHER | Age: 31
End: 2021-11-18
Attending: SURGERY
Payer: COMMERCIAL

## 2021-11-18 ENCOUNTER — ANESTHESIA (OUTPATIENT)
Dept: SURGERY | Facility: HOSPITAL | Age: 31
End: 2021-11-18
Payer: COMMERCIAL

## 2021-11-18 VITALS
RESPIRATION RATE: 18 BRPM | HEIGHT: 65 IN | TEMPERATURE: 98.8 F | DIASTOLIC BLOOD PRESSURE: 88 MMHG | SYSTOLIC BLOOD PRESSURE: 137 MMHG | BODY MASS INDEX: 37.99 KG/M2 | WEIGHT: 228 LBS | HEART RATE: 81 BPM | OXYGEN SATURATION: 97 %

## 2021-11-18 VITALS
TEMPERATURE: 98.6 F | DIASTOLIC BLOOD PRESSURE: 80 MMHG | BODY MASS INDEX: 37.99 KG/M2 | OXYGEN SATURATION: 98 % | WEIGHT: 228 LBS | HEIGHT: 65 IN | HEART RATE: 93 BPM | SYSTOLIC BLOOD PRESSURE: 124 MMHG

## 2021-11-18 DIAGNOSIS — Z90.49 S/P APPY: Primary | ICD-10-CM

## 2021-11-18 DIAGNOSIS — K35.209 ACUTE APPENDICITIS WITH GENERALIZED PERITONITIS, UNSPECIFIED WHETHER ABSCESS PRESENT, UNSPECIFIED WHETHER GANGRENE PRESENT, UNSPECIFIED WHETHER PERFORATION PRESENT: ICD-10-CM

## 2021-11-18 DIAGNOSIS — Z01.818 PREOP TESTING: Primary | ICD-10-CM

## 2021-11-18 DIAGNOSIS — M25.569 KNEE PAIN, UNSPECIFIED CHRONICITY, UNSPECIFIED LATERALITY: ICD-10-CM

## 2021-11-18 DIAGNOSIS — M54.17 LUMBOSACRAL RADICULOPATHY: ICD-10-CM

## 2021-11-18 DIAGNOSIS — M89.8X1 CLAVICLE PAIN: ICD-10-CM

## 2021-11-18 LAB — SARS-COV-2 RNA RESP QL NAA+PROBE: NEGATIVE

## 2021-11-18 PROCEDURE — 44970 LAPAROSCOPY APPENDECTOMY: CPT | Performed by: SURGERY

## 2021-11-18 PROCEDURE — 250N000011 HC RX IP 250 OP 636: Performed by: NURSE ANESTHETIST, CERTIFIED REGISTERED

## 2021-11-18 PROCEDURE — 710N000012 HC RECOVERY PHASE 2, PER MINUTE: Performed by: SURGERY

## 2021-11-18 PROCEDURE — 250N000011 HC RX IP 250 OP 636: Performed by: CLINICAL NURSE SPECIALIST

## 2021-11-18 PROCEDURE — 250N000011 HC RX IP 250 OP 636: Performed by: SURGERY

## 2021-11-18 PROCEDURE — 88304 TISSUE EXAM BY PATHOLOGIST: CPT | Mod: TC | Performed by: SURGERY

## 2021-11-18 PROCEDURE — 250N000025 HC SEVOFLURANE, PER MIN: Performed by: SURGERY

## 2021-11-18 PROCEDURE — 88342 IMHCHEM/IMCYTCHM 1ST ANTB: CPT | Mod: 26 | Performed by: PATHOLOGY

## 2021-11-18 PROCEDURE — 272N000001 HC OR GENERAL SUPPLY STERILE: Performed by: SURGERY

## 2021-11-18 PROCEDURE — 360N000076 HC SURGERY LEVEL 3, PER MIN: Performed by: SURGERY

## 2021-11-18 PROCEDURE — 250N000009 HC RX 250: Performed by: NURSE ANESTHETIST, CERTIFIED REGISTERED

## 2021-11-18 PROCEDURE — 370N000017 HC ANESTHESIA TECHNICAL FEE, PER MIN: Performed by: SURGERY

## 2021-11-18 PROCEDURE — 99203 OFFICE O/P NEW LOW 30 MIN: CPT | Mod: 57 | Performed by: SURGERY

## 2021-11-18 PROCEDURE — 250N000013 HC RX MED GY IP 250 OP 250 PS 637: Performed by: NURSE ANESTHETIST, CERTIFIED REGISTERED

## 2021-11-18 PROCEDURE — 999N000141 HC STATISTIC PRE-PROCEDURE NURSING ASSESSMENT: Performed by: SURGERY

## 2021-11-18 PROCEDURE — 250N000009 HC RX 250: Performed by: SURGERY

## 2021-11-18 PROCEDURE — G0463 HOSPITAL OUTPT CLINIC VISIT: HCPCS

## 2021-11-18 PROCEDURE — U0005 INFEC AGEN DETEC AMPLI PROBE: HCPCS | Mod: ZL | Performed by: SURGERY

## 2021-11-18 PROCEDURE — 44970 LAPAROSCOPY APPENDECTOMY: CPT | Performed by: NURSE ANESTHETIST, CERTIFIED REGISTERED

## 2021-11-18 PROCEDURE — 88304 TISSUE EXAM BY PATHOLOGIST: CPT | Mod: 26 | Performed by: PATHOLOGY

## 2021-11-18 PROCEDURE — 88341 IMHCHEM/IMCYTCHM EA ADD ANTB: CPT | Mod: 26 | Performed by: PATHOLOGY

## 2021-11-18 PROCEDURE — 258N000003 HC RX IP 258 OP 636: Performed by: NURSE ANESTHETIST, CERTIFIED REGISTERED

## 2021-11-18 PROCEDURE — 44970 LAPAROSCOPY APPENDECTOMY: CPT | Mod: AS | Performed by: CLINICAL NURSE SPECIALIST

## 2021-11-18 PROCEDURE — 710N000010 HC RECOVERY PHASE 1, LEVEL 2, PER MIN: Performed by: SURGERY

## 2021-11-18 RX ORDER — HYDROMORPHONE HYDROCHLORIDE 1 MG/ML
0.2 INJECTION, SOLUTION INTRAMUSCULAR; INTRAVENOUS; SUBCUTANEOUS EVERY 5 MIN PRN
Status: DISCONTINUED | OUTPATIENT
Start: 2021-11-18 | End: 2021-11-18 | Stop reason: HOSPADM

## 2021-11-18 RX ORDER — NALOXONE HYDROCHLORIDE 0.4 MG/ML
0.4 INJECTION, SOLUTION INTRAMUSCULAR; INTRAVENOUS; SUBCUTANEOUS
Status: DISCONTINUED | OUTPATIENT
Start: 2021-11-18 | End: 2021-11-18 | Stop reason: HOSPADM

## 2021-11-18 RX ORDER — LIDOCAINE 40 MG/G
CREAM TOPICAL
Status: DISCONTINUED | OUTPATIENT
Start: 2021-11-18 | End: 2021-11-18 | Stop reason: HOSPADM

## 2021-11-18 RX ORDER — MEPERIDINE HYDROCHLORIDE 25 MG/ML
12.5 INJECTION INTRAMUSCULAR; INTRAVENOUS; SUBCUTANEOUS
Status: DISCONTINUED | OUTPATIENT
Start: 2021-11-18 | End: 2021-11-18 | Stop reason: HOSPADM

## 2021-11-18 RX ORDER — FENTANYL CITRATE 50 UG/ML
INJECTION, SOLUTION INTRAMUSCULAR; INTRAVENOUS PRN
Status: DISCONTINUED | OUTPATIENT
Start: 2021-11-18 | End: 2021-11-18

## 2021-11-18 RX ORDER — OXYCODONE HYDROCHLORIDE 5 MG/1
5 TABLET ORAL EVERY 4 HOURS PRN
Status: DISCONTINUED | OUTPATIENT
Start: 2021-11-18 | End: 2021-11-18 | Stop reason: HOSPADM

## 2021-11-18 RX ORDER — DEXAMETHASONE SODIUM PHOSPHATE 10 MG/ML
INJECTION, SOLUTION INTRAMUSCULAR; INTRAVENOUS PRN
Status: DISCONTINUED | OUTPATIENT
Start: 2021-11-18 | End: 2021-11-18

## 2021-11-18 RX ORDER — IBUPROFEN 200 MG
600 TABLET ORAL
Status: DISCONTINUED | OUTPATIENT
Start: 2021-11-18 | End: 2021-11-18 | Stop reason: HOSPADM

## 2021-11-18 RX ORDER — HYDROCODONE BITARTRATE AND ACETAMINOPHEN 5; 325 MG/1; MG/1
1-2 TABLET ORAL EVERY 4 HOURS PRN
Qty: 10 TABLET | Refills: 0 | Status: SHIPPED | OUTPATIENT
Start: 2021-11-18 | End: 2021-12-03

## 2021-11-18 RX ORDER — SODIUM CHLORIDE, SODIUM LACTATE, POTASSIUM CHLORIDE, CALCIUM CHLORIDE 600; 310; 30; 20 MG/100ML; MG/100ML; MG/100ML; MG/100ML
INJECTION, SOLUTION INTRAVENOUS CONTINUOUS PRN
Status: DISCONTINUED | OUTPATIENT
Start: 2021-11-18 | End: 2021-11-18

## 2021-11-18 RX ORDER — CEFAZOLIN SODIUM 2 G/100ML
2 INJECTION, SOLUTION INTRAVENOUS
Status: COMPLETED | OUTPATIENT
Start: 2021-11-18 | End: 2021-11-18

## 2021-11-18 RX ORDER — CEFAZOLIN SODIUM 2 G/100ML
2 INJECTION, SOLUTION INTRAVENOUS SEE ADMIN INSTRUCTIONS
Status: DISCONTINUED | OUTPATIENT
Start: 2021-11-18 | End: 2021-11-18 | Stop reason: HOSPADM

## 2021-11-18 RX ORDER — KETOROLAC TROMETHAMINE 30 MG/ML
30 INJECTION, SOLUTION INTRAMUSCULAR; INTRAVENOUS ONCE
Status: COMPLETED | OUTPATIENT
Start: 2021-11-18 | End: 2021-11-18

## 2021-11-18 RX ORDER — BUPIVACAINE HYDROCHLORIDE 5 MG/ML
INJECTION, SOLUTION PERINEURAL PRN
Status: DISCONTINUED | OUTPATIENT
Start: 2021-11-18 | End: 2021-11-18 | Stop reason: HOSPADM

## 2021-11-18 RX ORDER — ONDANSETRON 4 MG/1
4 TABLET, ORALLY DISINTEGRATING ORAL EVERY 30 MIN PRN
Status: DISCONTINUED | OUTPATIENT
Start: 2021-11-18 | End: 2021-11-18 | Stop reason: HOSPADM

## 2021-11-18 RX ORDER — PROPOFOL 10 MG/ML
INJECTION, EMULSION INTRAVENOUS PRN
Status: DISCONTINUED | OUTPATIENT
Start: 2021-11-18 | End: 2021-11-18

## 2021-11-18 RX ORDER — AMOXICILLIN 250 MG
1-2 CAPSULE ORAL 2 TIMES DAILY
Qty: 30 TABLET | Refills: 0 | Status: SHIPPED | OUTPATIENT
Start: 2021-11-18 | End: 2021-12-03

## 2021-11-18 RX ORDER — BUPIVACAINE HYDROCHLORIDE 2.5 MG/ML
INJECTION, SOLUTION EPIDURAL; INFILTRATION; INTRACAUDAL
Status: DISCONTINUED
Start: 2021-11-18 | End: 2021-11-18 | Stop reason: HOSPADM

## 2021-11-18 RX ORDER — ONDANSETRON 2 MG/ML
4 INJECTION INTRAMUSCULAR; INTRAVENOUS EVERY 30 MIN PRN
Status: DISCONTINUED | OUTPATIENT
Start: 2021-11-18 | End: 2021-11-18 | Stop reason: HOSPADM

## 2021-11-18 RX ORDER — SODIUM CHLORIDE, SODIUM LACTATE, POTASSIUM CHLORIDE, CALCIUM CHLORIDE 600; 310; 30; 20 MG/100ML; MG/100ML; MG/100ML; MG/100ML
INJECTION, SOLUTION INTRAVENOUS CONTINUOUS
Status: DISCONTINUED | OUTPATIENT
Start: 2021-11-18 | End: 2021-11-18 | Stop reason: HOSPADM

## 2021-11-18 RX ORDER — NALOXONE HYDROCHLORIDE 0.4 MG/ML
0.2 INJECTION, SOLUTION INTRAMUSCULAR; INTRAVENOUS; SUBCUTANEOUS
Status: DISCONTINUED | OUTPATIENT
Start: 2021-11-18 | End: 2021-11-18 | Stop reason: HOSPADM

## 2021-11-18 RX ORDER — FENTANYL CITRATE 50 UG/ML
25 INJECTION, SOLUTION INTRAMUSCULAR; INTRAVENOUS EVERY 5 MIN PRN
Status: DISCONTINUED | OUTPATIENT
Start: 2021-11-18 | End: 2021-11-18 | Stop reason: HOSPADM

## 2021-11-18 RX ORDER — FENTANYL CITRATE 50 UG/ML
25 INJECTION, SOLUTION INTRAMUSCULAR; INTRAVENOUS
Status: DISCONTINUED | OUTPATIENT
Start: 2021-11-18 | End: 2021-11-18 | Stop reason: HOSPADM

## 2021-11-18 RX ORDER — HYDROCODONE BITARTRATE AND ACETAMINOPHEN 5; 325 MG/1; MG/1
1 TABLET ORAL
Status: DISCONTINUED | OUTPATIENT
Start: 2021-11-18 | End: 2021-11-18 | Stop reason: HOSPADM

## 2021-11-18 RX ADMIN — FENTANYL CITRATE 25 MCG: 0.05 INJECTION, SOLUTION INTRAMUSCULAR; INTRAVENOUS at 14:25

## 2021-11-18 RX ADMIN — FENTANYL CITRATE 50 MCG: 50 INJECTION, SOLUTION INTRAMUSCULAR; INTRAVENOUS at 12:52

## 2021-11-18 RX ADMIN — FENTANYL CITRATE 50 MCG: 50 INJECTION, SOLUTION INTRAMUSCULAR; INTRAVENOUS at 12:43

## 2021-11-18 RX ADMIN — CEFAZOLIN SODIUM 2 G: 2 INJECTION, SOLUTION INTRAVENOUS at 12:43

## 2021-11-18 RX ADMIN — FENTANYL CITRATE 100 MCG: 50 INJECTION, SOLUTION INTRAMUSCULAR; INTRAVENOUS at 13:07

## 2021-11-18 RX ADMIN — DEXAMETHASONE SODIUM PHOSPHATE 10 MG: 10 INJECTION, SOLUTION INTRAMUSCULAR; INTRAVENOUS at 12:56

## 2021-11-18 RX ADMIN — OXYCODONE HYDROCHLORIDE 5 MG: 5 TABLET ORAL at 14:58

## 2021-11-18 RX ADMIN — SODIUM CHLORIDE, POTASSIUM CHLORIDE, SODIUM LACTATE AND CALCIUM CHLORIDE: 600; 310; 30; 20 INJECTION, SOLUTION INTRAVENOUS at 13:30

## 2021-11-18 RX ADMIN — MIDAZOLAM 1 MG: 1 INJECTION INTRAMUSCULAR; INTRAVENOUS at 12:52

## 2021-11-18 RX ADMIN — KETOROLAC TROMETHAMINE 30 MG: 30 INJECTION, SOLUTION INTRAMUSCULAR; INTRAVENOUS at 15:36

## 2021-11-18 RX ADMIN — MIDAZOLAM 1 MG: 1 INJECTION INTRAMUSCULAR; INTRAVENOUS at 12:43

## 2021-11-18 RX ADMIN — FENTANYL CITRATE 25 MCG: 0.05 INJECTION, SOLUTION INTRAMUSCULAR; INTRAVENOUS at 14:09

## 2021-11-18 RX ADMIN — FENTANYL CITRATE 25 MCG: 0.05 INJECTION, SOLUTION INTRAMUSCULAR; INTRAVENOUS at 14:33

## 2021-11-18 RX ADMIN — Medication 100 MG: at 12:52

## 2021-11-18 RX ADMIN — PROPOFOL 200 MG: 10 INJECTION, EMULSION INTRAVENOUS at 12:52

## 2021-11-18 RX ADMIN — ROCURONIUM BROMIDE 10 MG: 10 INJECTION INTRAVENOUS at 12:52

## 2021-11-18 RX ADMIN — FENTANYL CITRATE 25 MCG: 0.05 INJECTION, SOLUTION INTRAMUSCULAR; INTRAVENOUS at 14:17

## 2021-11-18 RX ADMIN — SODIUM CHLORIDE, POTASSIUM CHLORIDE, SODIUM LACTATE AND CALCIUM CHLORIDE: 600; 310; 30; 20 INJECTION, SOLUTION INTRAVENOUS at 12:15

## 2021-11-18 ASSESSMENT — PAIN SCALES - GENERAL: PAINLEVEL: MODERATE PAIN (5)

## 2021-11-18 ASSESSMENT — LIFESTYLE VARIABLES: TOBACCO_USE: 1

## 2021-11-18 ASSESSMENT — MIFFLIN-ST. JEOR
SCORE: 1750.08
SCORE: 1750.08

## 2021-11-18 NOTE — PLAN OF CARE
Face to face report given with opportunity to observe patient.    Report given to Rico Abreu RN   11/18/2021  3:24 PM

## 2021-11-18 NOTE — PROGRESS NOTES
"[facility] Surgery Consultation    CC:  Abdominal pain.     HPI:  This 31 year old year old female is seen at the request of Dr. Olguin for evaluation of right sided abdominal pain. She reports the pain began over 2 days ago, it started while sitting studying. It started at her umbilicus and has now moved ot her RLQ. She was seen in the ED last night. CT scan showed and enlarged appendix. She continues to have pain, is unable to sit normally. Hurts to walk. No prior abdominal surgery.     No past medical history on file.    No past surgical history on file.    No Known Allergies    Current Outpatient Medications   Medication     ibuprofen (ADVIL/MOTRIN) 800 MG tablet     medroxyPROGESTERone (DEPO-PROVERA) 150 MG/ML injection     Current Facility-Administered Medications   Medication     medroxyPROGESTERone (DEPO-PROVERA) injection 150 mg       HABITS:    Social History     Tobacco Use     Smoking status: Never Smoker     Smokeless tobacco: Never Used   Substance Use Topics     Alcohol use: Yes     Comment: occasionally     Drug use: No       Family History   Problem Relation Age of Onset     Other Cancer Mother      Chronic Obstructive Pulmonary Disease Mother      Hypertension Mother      Asthma Mother      Attention Deficit Disorder Mother      Irritable Bowel Syndrome Mother      Other Cancer Father      Hypertension Father      Alcoholism Father      Thyroid Disease Maternal Grandmother      Multiple myeloma Maternal Grandmother      Coronary Artery Disease Maternal Grandfather      Prostate Cancer Maternal Grandfather        REVIEW OF SYSTEMS:  Ten point review of systems negative except those mentioned in the HPI.     OBJECTIVE:    /80   Pulse 93   Temp 98.6  F (37  C)   Ht 1.651 m (5' 5\")   Wt 103.4 kg (228 lb)   SpO2 98%   BMI 37.94 kg/m      GENERAL: Generally appears well, in no distress with appropriate affect.  HEENT:   Sclerae anicteric - normocephalic atraumatic   Respiratory:  No " acute distress, no splinting   Cardiovascular:  Regular Rate and Rhythm  Abdomen: Focal tenderness in RLQ with rebound.   :  deferred  Extremities:  Extremities normal. No deformities, edema, or skin discoloration.  Skin:  no suspicious lesions or rashes  Neurological: grossly intact  Psych:  Alert, oriented, affect appropriate with normal decision making ability.    IMPRESSION:    Clinical history and exam consistent with appendicitis. CT scan not totally convincing. Discussed the risks and benefits of surgery and she would like to proceed with surgery. Will plan to send home after surgery as there are no beds in hospital.     PLAN:    Lap appendectomy     Antonino Manjarrez MD,     11/18/2021  9:53 AM

## 2021-11-18 NOTE — ANESTHESIA PREPROCEDURE EVALUATION
Anesthesia Pre-Procedure Evaluation    Patient: Yulissa Breaux   MRN: [id number] : 1990        Preoperative Diagnosis: Acute appendicitis with generalized peritonitis, unspecified whether abscess present, unspecified whether gangrene present, unspecified whether perforation present [K35.20]    Procedure : Procedure(s):  APPENDECTOMY, LAPAROSCOPIC          History reviewed. No pertinent past medical history.   History reviewed. No pertinent surgical history.   No Known Allergies   Social History     Tobacco Use     Smoking status: Never Smoker     Smokeless tobacco: Never Used   Substance Use Topics     Alcohol use: Yes     Comment: occasionally      Wt Readings from Last 1 Encounters:   21 103.4 kg (228 lb)        Anesthesia Evaluation   Pt has had prior anesthetic. Type: General.        ROS/MED HX  ENT/Pulmonary:     (+) tobacco use, Past use,     Neurologic:       Cardiovascular:  - neg cardiovascular ROS     METS/Exercise Tolerance: >4 METS    Hematologic:  - neg hematologic  ROS     Musculoskeletal: Comment: L ankle pain      GI/Hepatic:     (+) appendicitis,     Renal/Genitourinary:  - neg Renal ROS     Endo:     (+) Obesity,     Psychiatric/Substance Use:  - neg psychiatric ROS     Infectious Disease:  - neg infectious disease ROS     Malignancy:  - neg malignancy ROS     Other:  - neg other ROS          Physical Exam    Airway        Mallampati: I   TM distance: > 3 FB   Neck ROM: full   Mouth opening: > 3 cm    Respiratory Devices and Support         Dental  no notable dental history         Cardiovascular   cardiovascular exam normal       Rhythm and rate: regular and normal     Pulmonary   pulmonary exam normal        breath sounds clear to auscultation           OUTSIDE LABS:  CBC:   Lab Results   Component Value Date    WBC 6.3 2021    WBC 6.1 2020    HGB 14.7 2021    HGB 15.1 2020    HCT 44.4 2021    HCT 45.2 2020     2021      09/14/2020     BMP:   Lab Results   Component Value Date     11/17/2021     09/14/2020    POTASSIUM 3.4 11/17/2021    POTASSIUM 4.0 09/14/2020    CHLORIDE 106 11/17/2021    CHLORIDE 107 09/14/2020    CO2 25 11/17/2021    CO2 27 09/14/2020    BUN 7 11/17/2021    BUN 11 09/14/2020    CR 0.81 11/17/2021    CR 0.79 09/14/2020    GLC 94 11/17/2021    GLC 90 09/14/2020     COAGS:   Lab Results   Component Value Date    INR 1.11 11/17/2021     POC:   Lab Results   Component Value Date    HCG Negative 11/17/2021     HEPATIC:   Lab Results   Component Value Date    ALBUMIN 3.8 11/17/2021    ALBUMIN 3.8 11/17/2021    PROTTOTAL 8.6 11/17/2021    PROTTOTAL 8.6 11/17/2021    ALT 41 11/17/2021    ALT 41 11/17/2021    AST 21 11/17/2021    AST 21 11/17/2021    ALKPHOS 123 11/17/2021    ALKPHOS 123 11/17/2021    BILITOTAL 0.5 11/17/2021    BILITOTAL 0.5 11/17/2021     OTHER:   Lab Results   Component Value Date    LACT 0.9 11/17/2021    CAROL 9.0 11/17/2021    LIPASE 88 11/17/2021    TSH 0.82 01/25/2021       Anesthesia Plan    ASA Status:  2   NPO Status:  NPO Appropriate    Anesthesia Type: General.     - Airway: ETT   Induction: Propofol.   Maintenance: Balanced.        Consents    Anesthesia Plan(s) and associated risks, benefits, and realistic alternatives discussed. Questions answered and patient/representative(s) expressed understanding.    - Discussed:     - Discussed with:  Patient      - Extended Intubation/Ventilatory Support Discussed: Yes.      - Patient is DNR/DNI Status: No    Use of blood products discussed: No .     Postoperative Care    Pain management: IV analgesics, Oral pain medications, Multi-modal analgesia.   PONV prophylaxis: Ondansetron (or other 5HT-3), Dexamethasone or Solumedrol     Comments:                LEORA Bennett CRNA

## 2021-11-18 NOTE — ED NOTES
Plan of care discussed with patient. If patient's symptoms persist, patient is to go to the clinic at 9 am. If patient gets worse, patient is to come back to the ED. Patient denies any questions/concerns. Patient ambulated out of the ED with mom for a steady and balanced gait.

## 2021-11-18 NOTE — OR NURSING
Patient and responsible adult given discharge instructions with no questions regarding instructions. Gaurav score 19. Pain level 4/10.  Discharged from unit via w/c. Patient discharged to home with .

## 2021-11-18 NOTE — ANESTHESIA PROCEDURE NOTES
Airway         Procedure Start/Stop Times: 11/18/2021 12:51 PM and 11/18/2021 12:53 PM  Staff -        CRNA: Pepe Riley APRN CRNA       Performed By: CRNA       Referred By: Dr. Manjarrez  Consent for Airway        Urgency: emergent  Indications and Patient Condition       Indications for airway management: donald-procedural       Induction type:intravenous       Mask difficulty assessment: 1 - vent by mask    Final Airway Details       Final airway type: endotracheal airway       Successful airway: ETT - single  Endotracheal Airway Details        ETT size (mm): 7.0       Cuffed: yes       Successful intubation technique: direct laryngoscopy       DL Blade Type: MAC 3       Grade View of Cords: 1       Adjucts: stylet       Position: Center       Measured from: lips       Secured at (cm): 22       Bite block used: None    Post intubation assessment        Placement verified by: capnometry, equal breath sounds and chest rise        Number of attempts at approach: 1       Secured with: plastic tape       Ease of procedure: easy       Dentition: Intact and Unchanged

## 2021-11-18 NOTE — ED PROVIDER NOTES
History     Chief Complaint   Patient presents with     Abdominal Pain     HPI  Yulissa Breaux is a 31 year old female who presents with report of 2 days of right lower quadrant pain.  No urinary symptoms.  No vaginal bleeding.  No nausea or vomiting.  No right upper quadrant tenderness.  No left lower quadrant tenderness.  No radiation.  No other associated symptoms.  No history of similar.  Is on the Depo shot.  No injury or trauma.  Does not desire any opioids.  She notes she would like to know what is going on.  Duration ongoing.  Character persistent.  Denies possibility of STI.    Allergies:  No Known Allergies    Problem List:    There are no problems to display for this patient.       Past Medical History: Denies any abdominal history of problems.  History reviewed. No pertinent past medical history.    Past Surgical History:    History reviewed. No pertinent surgical history.    Family History:    Family History   Problem Relation Age of Onset     Other Cancer Mother      Chronic Obstructive Pulmonary Disease Mother      Hypertension Mother      Asthma Mother      Attention Deficit Disorder Mother      Irritable Bowel Syndrome Mother      Other Cancer Father      Hypertension Father      Alcoholism Father      Thyroid Disease Maternal Grandmother      Coronary Artery Disease Maternal Grandfather      Prostate Cancer Maternal Grandfather        Social History:  Marital Status:   [2]  Social History     Tobacco Use     Smoking status: Never Smoker     Smokeless tobacco: Never Used   Substance Use Topics     Alcohol use: Yes     Comment: occasionally     Drug use: No        Medications:    ibuprofen (ADVIL/MOTRIN) 800 MG tablet  medroxyPROGESTERone (DEPO-PROVERA) 150 MG/ML injection          Review of Systems   Respiratory denies.  Cardiovascular eyes.  GI per HPI.   denies.  Remainder of complete 10 point review of systems negative.    Physical Exam   BP: 155/97  Pulse: 81  Temp: 100  F (37.8   C)  Resp: 16  SpO2: 97 %      Physical Exam  Constitutional:       Appearance: She is well-developed.   HENT:      Head: Normocephalic and atraumatic.   Eyes:      Extraocular Movements: Extraocular movements intact.      Pupils: Pupils are equal, round, and reactive to light.   Cardiovascular:      Rate and Rhythm: Normal rate.      Heart sounds: Normal heart sounds.   Pulmonary:      Effort: Pulmonary effort is normal. No respiratory distress.      Breath sounds: No wheezing.   Abdominal:      Comments: Abdomen with right lower quadrant pain.  Peritoneal signs.  No right upper quadrant tenderness.  No rebound or guarding.   Skin:     General: Skin is warm and dry.      Capillary Refill: Capillary refill takes less than 2 seconds.   Neurological:      Mental Status: She is alert.   Psychiatric:         Mood and Affect: Mood normal.              Results for orders placed or performed during the hospital encounter of 11/17/21 (from the past 24 hour(s))   HCG qualitative urine   Result Value Ref Range    hCG Urine Qualitative Negative Negative   UA with Microscopic reflex to Culture    Specimen: Urine, Midstream   Result Value Ref Range    Color Urine Yellow Colorless, Straw, Light Yellow, Yellow    Appearance Urine Clear Clear    Glucose Urine Negative Negative mg/dL    Bilirubin Urine Negative Negative    Ketones Urine Negative Negative mg/dL    Specific Gravity Urine 1.024 1.003 - 1.035    Blood Urine Negative Negative    pH Urine 5.5 4.7 - 8.0    Protein Albumin Urine Negative Negative mg/dL    Urobilinogen Urine Normal Normal, 2.0 mg/dL    Nitrite Urine Negative Negative    Leukocyte Esterase Urine Negative Negative    Mucus Urine Present (A) None Seen /LPF    RBC Urine 0 <=2 /HPF    WBC Urine 3 <=5 /HPF    Squamous Epithelials Urine 4 (H) <=1 /HPF    Narrative    Urine Culture not indicated   Comprehensive metabolic panel   Result Value Ref Range    Sodium 139 133 - 144 mmol/L    Potassium 3.4 3.4 - 5.3 mmol/L     Chloride 106 94 - 109 mmol/L    Carbon Dioxide (CO2) 25 20 - 32 mmol/L    Anion Gap 8 3 - 14 mmol/L    Urea Nitrogen 7 7 - 30 mg/dL    Creatinine 0.81 0.52 - 1.04 mg/dL    Calcium 9.0 8.5 - 10.1 mg/dL    Glucose 94 70 - 99 mg/dL    Alkaline Phosphatase 123 40 - 150 U/L    AST 21 0 - 45 U/L    ALT 41 0 - 50 U/L    Protein Total 8.6 6.8 - 8.8 g/dL    Albumin 3.8 3.4 - 5.0 g/dL    Bilirubin Total 0.5 0.2 - 1.3 mg/dL    GFR Estimate >90 >60 mL/min/1.73m2   CBC with platelets differential    Narrative    The following orders were created for panel order CBC with platelets differential.  Procedure                               Abnormality         Status                     ---------                               -----------         ------                     CBC with platelets and d...[[id number]]                      Final result                 Please view results for these tests on the individual orders.   Lipase   Result Value Ref Range    Lipase 88 73 - 393 U/L   HCG quantitative pregnancy (blood)   Result Value Ref Range    hCG Quantitative <1 0 - 5 IU/L   Hepatic panel   Result Value Ref Range    Bilirubin Total 0.5 0.2 - 1.3 mg/dL    Bilirubin Direct 0.1 0.0 - 0.2 mg/dL    Protein Total 8.6 6.8 - 8.8 g/dL    Albumin 3.8 3.4 - 5.0 g/dL    Alkaline Phosphatase 123 40 - 150 U/L    AST 21 0 - 45 U/L    ALT 41 0 - 50 U/L   CBC with platelets and differential   Result Value Ref Range    WBC Count 6.3 4.0 - 11.0 10e3/uL    RBC Count 5.12 3.80 - 5.20 10e6/uL    Hemoglobin 14.7 11.7 - 15.7 g/dL    Hematocrit 44.4 35.0 - 47.0 %    MCV 87 78 - 100 fL    MCH 28.7 26.5 - 33.0 pg    MCHC 33.1 31.5 - 36.5 g/dL    RDW 12.3 10.0 - 15.0 %    Platelet Count 323 150 - 450 10e3/uL    % Neutrophils 65 %    % Lymphocytes 27 %    % Monocytes 8 %    % Eosinophils 0 %    % Basophils 0 %    % Immature Granulocytes 0 %    NRBCs per 100 WBC 0 <1 /100    Absolute Neutrophils 4.0 1.6 - 8.3 10e3/uL    Absolute Lymphocytes 1.7 0.8 - 5.3 10e3/uL     Absolute Monocytes 0.5 0.0 - 1.3 10e3/uL    Absolute Eosinophils 0.0 0.0 - 0.7 10e3/uL    Absolute Basophils 0.0 0.0 - 0.2 10e3/uL    Absolute Immature Granulocytes 0.0 <=0.0 10e3/uL    Absolute NRBCs 0.0 10e3/uL   INR   Result Value Ref Range    INR 1.11 0.85 - 1.15   Lactic acid whole blood   Result Value Ref Range    Lactic Acid 0.9 0.7 - 2.0 mmol/L   CT Abdomen Pelvis w Contrast    Addendum: 11/17/2021    Upon further review, the appendix is at the upper limits of normal in  size. Cannot exclude early appendicitis.    MICHAELA SHUKLA MD         SYSTEM ID:  [id number]      Narrative    Exam:CT ABDOMEN PELVIS W CONTRAST    History: 31 years Female ?presents with report of 2 days of right  lower quadrant pain. ?     Comparisons:    Technique: Axial CT imaging of the abdomen and pelvis was performed  with intervenous contrast. Coronal and sagittal reconstructions were  obtained      FINDINGS:  Lung bases:The lung bases are clear    Abdomen:  Liver: There are 2 small low-density lesions in the right lobe of the  liver, too small to characterize. There is a 7 mm lesion, series 2  image 38. There is a 5 mm lesion, series 2 image 33. The liver is  otherwise unremarkable.  Gallbladder and biliary tree:No calcified gallstones are present.  There is no evidence of biliary dilatation.  Pancreas:Unremarkable  Spleen:Unremarkable  Adrenals:Normal    Kidneys and ureters:Unremarkable    Lymph nodes:There is no significant lymphadenopathy    Bowel:No abnormally distended or thickened loops of bowel are present.  There is no evidence of bowel obstruction.    Appendix:Unremarkable    Vessels:Unremarkable    Osseous structures:Unremarkable    Pelvis:There is no evidence of mass or lymphadenopathy. No abnormal  fluid collections are present.            Impression    IMPRESSION: No acute intra-abdominal findings. There is no evidence of  appendicitis or other inflammatory process.    2 small low-density hepatic lesions, too small  to definitively  characterize. Clinical significance doubtful. Consider follow-up CT in  3-6 months.    MICHAELA SHUKLA MD         SYSTEM ID:  [id number]   US Pelvis Cmplt w Transvag & Doppler LmtPel Duplex Limited    Narrative    US PELVIS COMPLETE W TRANSVAGINAL AND DOPPLER LIMITED    HISTORY: 31 years Female RLQ pain    TECHNIQUE: Transabdominal and transvaginal grayscale and color duplex  ultrasonography of the pelvis was performed.    COMPARISON: None    FINDINGS:    The uterus measures 8.2 x 4.3 x 5.0 cm.    Endometrial thickness is 8 mm.    The right ovary is 2.4 x 1.9 x 2.1 cm.     The left ovary is 2.9 x 1.4 x 1.7 cm.     Doppler flow is visible in both ovaries. There is no evidence of  torsion.    No adnexal masses or abnormal fluid collection is present      Impression    IMPRESSION: No evidence of pelvic mass or abnormal fluid collection.  No evidence of ovarian torsion.    MICHAELA SHUKLA MD         SYSTEM ID:  [id number]       Medications   0.9% sodium chloride BOLUS (1,000 mLs Intravenous New Bag 11/17/21 1852)     Followed by   sodium chloride 0.9% infusion (has no administration in time range)   iopamidol (ISOVUE-370) solution 500 mL (106 mLs Intravenous Given 11/17/21 1939)   sodium chloride (PF) 0.9% PF flush 60 mL (60 mLs Intravenous Given 11/17/21 1936)       Assessments & Plan (with Medical Decision Making)   31-year-old female presenting with right lower quadrant pain, onset yesterday, no fever, not tachycardic, no peritoneal signs on exam, no other associated symptoms, laboratories nonrevealing, CT with possible early appendicitis.  The patient would like to go home.  There are no beds on site.  Discussed with surgery and plan for n.p.o. after midnight with understanding that she should be reassessed tomorrow morning in pod 10 with Dr. Manjarrez if symptoms persist or return sooner if new or concerning symptoms or persistence or any concerns otherwise.  The patient did not require any Zofran or  analgesic during the stay.  She understands that she may very well have early appendicitis.    New Prescriptions    No medications on file       Final diagnoses:   Right lower quadrant pain       11/17/2021   [facility]     Ehsan Olguin MD  11/17/21 3596

## 2021-11-18 NOTE — DISCHARGE INSTRUCTIONS
Follow-up tomorrow at 9 AM with Dr. Manjarrez if symptoms persist.    Do not eat anything after midnight.  Return if any new or concerning symptoms.

## 2021-11-18 NOTE — ANESTHESIA POSTPROCEDURE EVALUATION
Patient: Yulissa Breaux    Procedure: Procedure(s):  APPENDECTOMY, LAPAROSCOPIC       Diagnosis:Acute appendicitis with generalized peritonitis, unspecified whether abscess present, unspecified whether gangrene present, unspecified whether perforation present [K35.20]  Diagnosis Additional Information: No value filed.    Anesthesia Type:  General    Note:  Disposition: Outpatient   Postop Pain Control: Uneventful            Sign Out: Well controlled pain   PONV: No   Neuro/Psych: Uneventful            Sign Out: Acceptable/Baseline neuro status   Airway/Respiratory: Uneventful            Sign Out: Acceptable/Baseline resp. status   CV/Hemodynamics: Uneventful            Sign Out: Acceptable CV status; No obvious hypovolemia; No obvious fluid overload   Other NRE: NONE   DID A NON-ROUTINE EVENT OCCUR? No           Last vitals:  Vitals Value Taken Time   /79 11/18/21 1445   Temp 98  F (36.7  C) 11/18/21 1445   Pulse 80 11/18/21 1448   Resp 16 11/18/21 1448   SpO2 99 % 11/18/21 1450   Vitals shown include unvalidated device data.    Electronically Signed By: LEORA Bennett CRNA  November 18, 2021  4:37 PM

## 2021-11-18 NOTE — ED NOTES
Face to face report given with opportunity to observe patient.    Report given to NGUYỄN Nieves RN   11/17/2021  7:07 PM

## 2021-11-18 NOTE — NURSING NOTE
"Chief Complaint   Patient presents with     ER F/U     right lower quadrant pain.        Initial /80   Pulse 93   Temp 98.6  F (37  C)   Ht 1.651 m (5' 5\")   Wt 103.4 kg (228 lb)   SpO2 98%   BMI 37.94 kg/m   Estimated body mass index is 37.94 kg/m  as calculated from the following:    Height as of this encounter: 1.651 m (5' 5\").    Weight as of this encounter: 103.4 kg (228 lb).  Medication Reconciliation: complete  JOESPH BUI LPN    "

## 2021-11-18 NOTE — ED NOTES
Patient denies any needs at this time. Patient laying in bed and mother is at bedside. Patient was going down for US at this time.

## 2021-11-18 NOTE — PATIENT INSTRUCTIONS
Thank you for allowing Dr. Manjarrez and our surgical team to participate in your care. Please call our health unit coordinator at [phone] with scheduling questions or the nurse at [phone] with any other questions or concerns.    Patient Education     Appendectomy    An appendectomy is surgery to remove the appendix. The goal is to remove the appendix safely. In most cases, the surgery takes about 30 to 60 minutes. If your appendix has burst, surgery may take longer.   Before surgery  You may get fluids, antibiotics, and other medicines through an IV (intravenous) line. Tell your healthcare provider if you are allergic to any medicines or have any other health concerns. You will be given anesthesia just before your appendectomy. This keeps you pain-free and allows you to sleep during the surgery.   Types of surgery  An appendectomy may be done in 2 ways. Your surgeon will discuss which method is best for you:     Open surgery. One cut or incision, several inches long, is made in your lower right side. A bigger incision may be used if the appendix has burst.    Laparoscopic surgery. About 2 to 4 small incisions are used. One is near your belly button. The others are on other parts of your belly (abdomen). A thin tube with a tiny camera attached (laparoscope) is inserted through 1 incision. The camera shows the inside of your abdomen on a video screen. This image helps guide the surgery. Tiny surgical tools are put into the other incisions.  Finishing the surgery  In most cases, the full incision is closed with stitches or staples. Your surgeon may place a short-term (temporary) drain in the wound or in your abdomen. This helps remove any extra fluid. This may help prevent infection. This drain is usually taken out before you are discharged. If your appendix has burst, the outer layers of the incision may be left open. Leaving the skin open prevents infection from forming under the skin. It may heal on its own.  Or it may be closed about 5 days later.   After the surgery  Keep any recommended follow-up appointments with your provider. If you are told to take any medicines, take them as directed. If you are given breathing exercises, do them as directed. After the surgery, your appendix is checked under a microscope. Your provider will tell you the results.     Call your healthcare provider if you have any of the following:     Swelling, oozing, worsening pain, or redness near the incision    A fever of 100.4  F (38  C) or higher, or as directed by your healthcare provider    Belly pain that gets worse    Severe diarrhea, bloating, or constipation    Upset stomach (nausea) or vomiting    Trouble breathing    Swelling or pain in your legs?        [employer] last reviewed this educational content on 7/1/2018 2000-2021 [employer]. All rights reserved. This information is not intended as a substitute for professional medical care. Always follow your healthcare professional's instructions.

## 2021-11-18 NOTE — OP NOTE
PREOPERATIVE DIAGNOSIS:  Acute appendicitis.       POSTOPERATIVE DIAGNOSIS:  Acute tip appendicitis.       PROCEDURE:  Laparoscopic appendectomy.       HISTORY:  See consult note      OPERATIVE FINDINGS:  There was acute appendicitis of the tip without suggestion of perforation. Infarction of the fat at the tip of the appendix.      DESCRIPTION OF PROCEDURE:  With the patient in the supine position on the operating table, general anesthesia was induced.  The abdomen was prepped and draped sterilely and the requisite timeout pause was observed during which her  correct identity and planned procedure were confirmed.  Using the 5mm optical trocar the abdomen was entered under direct visualization.  A pneumoperitoneum was achieved with insufflation of carbon dioxide to 15 mm hg and a second 5 mm port site was placed in the suprapubic and 12mm port was placed infraumbilical under direct vision.  Inspection showed the above-described finding without suggestion of additional obvious intraabdominal pathology.  The base of the appendix was identified and gasped with a ratcheted clamp.  It was elevated cephalad and, using sharp and blunt dissection, the base was developed at its cecal origin.  It was ligated and divided using the linear stapling device with deep staples flush with the cecum.  The mesoappendix was taken with a fire of the linear stapling device using the vascular staples.    The specimen was retrieved intact through the umbilical port site in an Endocatch bag.      The 12mm trocar site was closed with 0-vicryl under direct visualization   With a final inspection confirming satisfactory hemostasis, the procedure was concluded by removing the instruments and trocars and evacuating the pneumoperitoneum.  The skin was reapproximated with subdermal 4-0 monocryl reinforced with steristrips.  Sterile dressings were applied and patient was transported to the recovery room in satisfactory condition.  The estimated  blood loss was minimal and there were no apparent complications.  The procedure was well tolerated and the patient left the operating room in good condition upon confirmation that the final sponge, needle and instrument counts were correct.     MD Agnely Wilson actively first assisted during this operation providing necessary retraction and exposure as well as maintaining hemostasis and a clear operative field. This was helpful in allowing the operation to proceed in a safe and expeditious manner. She was present for the entire duration of the operation.

## 2021-11-18 NOTE — H&P
CC:  Abdominal pain.      HPI:  This 31 year old year old female is seen at the request of Dr. Olguin for evaluation of right sided abdominal pain. She reports the pain began over 2 days ago, it started while sitting studying. It started at her umbilicus and has now moved ot her RLQ. She was seen in the ED last night. CT scan showed and enlarged appendix. She continues to have pain, is unable to sit normally. Hurts to walk. No prior abdominal surgery.      Past Medical History   No past medical history on file.        Past Surgical History   No past surgical history on file.        No Known Allergies         Current Outpatient Medications   Medication     ibuprofen (ADVIL/MOTRIN) 800 MG tablet     medroxyPROGESTERone (DEPO-PROVERA) 150 MG/ML injection          Current Facility-Administered Medications   Medication     medroxyPROGESTERone (DEPO-PROVERA) injection 150 mg         HABITS:     Social History            Tobacco Use     Smoking status: Never Smoker     Smokeless tobacco: Never Used   Substance Use Topics     Alcohol use: Yes       Comment: occasionally     Drug use: No         Family History         Family History   Problem Relation Age of Onset     Other Cancer Mother       Chronic Obstructive Pulmonary Disease Mother       Hypertension Mother       Asthma Mother       Attention Deficit Disorder Mother       Irritable Bowel Syndrome Mother       Other Cancer Father       Hypertension Father       Alcoholism Father       Thyroid Disease Maternal Grandmother       Multiple myeloma Maternal Grandmother       Coronary Artery Disease Maternal Grandfather       Prostate Cancer Maternal Grandfather              REVIEW OF SYSTEMS:     Constitutional: Negative for fever, chills.   HENT: Negative for ear pain, congestion, sore throat, and ear discharge.    Eyes: Negative for blurred vision, double vision.   Respiratory: Negative for cough, hemoptysis, shortness of breath, wheezing and stridor.    Cardiovascular:  "Negative for chest pain, palpitations and orthopnea.   Gastrointestinal: Negative for heartburn, nausea, vomiting, abdominal pain and blood in stool.   Genitourinary: Negative for urgency, frequency   Musculoskeletal: Negative for myalgias, back pain and joint pain.   Neurological: Negative for tingling, speech change and headaches.   Endo/Heme/Allergies: Does not bruise/bleed easily.   Psychiatric/Behavioral: Negative for depression, suicidal ideas and hallucinations. The patient is not nervous/anxious.       OBJECTIVE:      /74   Pulse 80   Temp 98.4  F (36.9  C) (Tympanic)   Resp 16   Ht 1.651 m (5' 5\")   Wt 103.4 kg (228 lb)   LMP 10/28/2021 (Approximate)   SpO2 96%   BMI 37.94 kg/m       GENERAL: Generally appears well, in no distress with appropriate affect.  HEENT:   Sclerae anicteric - normocephalic atraumatic   Respiratory:  Respirations even and unlabored, clear throughout  Cardiovascular:  Regular Rate and Rhythm  Abdomen: Focal tenderness in RLQ with rebound.   :  deferred  Extremities:  Extremities normal. No deformities, edema, or skin discoloration.  Skin:  no suspicious lesions or rashes  Neurological: grossly intact  Psych:  Alert, oriented, affect appropriate with normal decision making ability.     IMPRESSION:    Clinical history and exam consistent with appendicitis. CT scan not totally convincing. Discussed the risks and benefits of surgery and she would like to proceed with surgery. Will plan to send home after surgery as there are no beds in hospital.      PLAN:    Lap appendectomy     Alecia العراقي CNS  Surgery and Wound Care  [place]    "

## 2021-11-21 NOTE — TELEPHONE ENCOUNTER
ADVIL      Last Written Prescription Date:  9-  Last Fill Quantity:60,   # refills: 0  Last Office Visit: 3-  Future Office visit:       Routing refill request to provider for review/approval because:  Drug not on the [facility], [facility] or [facility] refill protocol or controlled substance

## 2021-11-22 DIAGNOSIS — K35.209 ACUTE APPENDICITIS WITH GENERALIZED PERITONITIS, UNSPECIFIED WHETHER ABSCESS PRESENT, UNSPECIFIED WHETHER GANGRENE PRESENT, UNSPECIFIED WHETHER PERFORATION PRESENT: ICD-10-CM

## 2021-11-22 DIAGNOSIS — Z90.49 S/P APPY: ICD-10-CM

## 2021-11-22 RX ORDER — TRAMADOL HYDROCHLORIDE 50 MG/1
50 TABLET ORAL EVERY 6 HOURS PRN
Qty: 10 TABLET | Refills: 0 | Status: SHIPPED | OUTPATIENT
Start: 2021-11-22 | End: 2021-11-25

## 2021-11-22 RX ORDER — IBUPROFEN 800 MG/1
TABLET, FILM COATED ORAL
Qty: 60 TABLET | Refills: 0 | Status: SHIPPED | OUTPATIENT
Start: 2021-11-22 | End: 2022-01-02

## 2021-11-24 LAB
PATH REPORT.ADDENDUM SPEC: NORMAL
PATH REPORT.COMMENTS IMP SPEC: NORMAL
PATH REPORT.COMMENTS IMP SPEC: NORMAL
PATH REPORT.FINAL DX SPEC: NORMAL
PATH REPORT.GROSS SPEC: NORMAL
PATH REPORT.MICROSCOPIC SPEC OTHER STN: NORMAL
PATH REPORT.RELEVANT HX SPEC: NORMAL
PHOTO IMAGE: NORMAL

## 2021-12-03 ENCOUNTER — OFFICE VISIT (OUTPATIENT)
Dept: SURGERY | Facility: OTHER | Age: 31
End: 2021-12-03
Attending: CLINICAL NURSE SPECIALIST
Payer: COMMERCIAL

## 2021-12-03 VITALS
HEIGHT: 65 IN | WEIGHT: 229 LBS | HEART RATE: 88 BPM | BODY MASS INDEX: 38.15 KG/M2 | SYSTOLIC BLOOD PRESSURE: 122 MMHG | RESPIRATION RATE: 16 BRPM | TEMPERATURE: 99.5 F | DIASTOLIC BLOOD PRESSURE: 84 MMHG | OXYGEN SATURATION: 98 %

## 2021-12-03 DIAGNOSIS — Z90.49 S/P LAPAROSCOPIC APPENDECTOMY: ICD-10-CM

## 2021-12-03 DIAGNOSIS — Z98.890 POSTOPERATIVE STATE: Primary | ICD-10-CM

## 2021-12-03 PROCEDURE — G0463 HOSPITAL OUTPT CLINIC VISIT: HCPCS

## 2021-12-03 PROCEDURE — 99024 POSTOP FOLLOW-UP VISIT: CPT | Performed by: CLINICAL NURSE SPECIALIST

## 2021-12-03 ASSESSMENT — PAIN SCALES - GENERAL: PAINLEVEL: NO PAIN (0)

## 2021-12-03 ASSESSMENT — MIFFLIN-ST. JEOR: SCORE: 1754.62

## 2021-12-03 NOTE — PATIENT INSTRUCTIONS
Thank you for allowing SIMONA Leslie and our surgical team to participate in your care. Please call our health unit coordinator at [phone] with scheduling questions or the nurse at [phone] with any other questions or concerns.

## 2021-12-03 NOTE — PROGRESS NOTES
"S:  Yulissa returns two weeks after laparoscopic appendectomy  by Dr. Manjarrez for acute appendicitis.  Doing well post operatively, tolerating a general diet, having regular bowel movements, passing flatus, no diarrhea, doesn't take narcotics regularly. Specifically denies fevers, chills, nausea, vomiting, shortness of breath.     O:  /84   Pulse 88   Temp 99.5  F (37.5  C) (Tympanic)   Resp 16   Ht 1.651 m (5' 5\")   Wt 103.9 kg (229 lb)   SpO2 98%   BMI 38.11 kg/m    Gen: Alert and orientedx4, no apparent distress, ambulating without difficulty  Abd: Soft, ND/NT no rebound, no guarding  Wound: Clean, dry, intact, no erythema, necrosis or drainage worrisome for infection.     A/P  #1 S/P Laparoscopic appendectomy     She looks wonderful. She has made a satisfactory recovery from surgery. I do not need to see her again unless she has any issues in regard to this surgery. I've provided office phone numbers and told her not to hesitate to call with any questions, comments or concerns. I'd like a call if she has any fevers over 101.3, nausea/vomiting, pain out of control.      Alecia العراقي CNS  Surgery and Wound Care  [place]  "

## 2021-12-03 NOTE — NURSING NOTE
"Chief Complaint   Patient presents with     Surgical Followup     laparoscopic appendectomy 11/18/21       Initial There were no vitals taken for this visit. Estimated body mass index is 37.94 kg/m  as calculated from the following:    Height as of 11/18/21: 1.651 m (5' 5\").    Weight as of 11/18/21: 103.4 kg (228 lb).  Medication Reconciliation: complete  Mary Polanco LPN  "

## 2021-12-07 ENCOUNTER — HOSPITAL ENCOUNTER (OUTPATIENT)
Dept: MRI IMAGING | Facility: HOSPITAL | Age: 31
Discharge: HOME | End: 2021-12-07
Attending: ORTHOPAEDIC SURGERY | Admitting: ORTHOPAEDIC SURGERY
Payer: COMMERCIAL

## 2021-12-07 PROCEDURE — A9585 GADOBUTROL INJECTION: HCPCS | Performed by: RADIOLOGY

## 2021-12-07 PROCEDURE — 71552 MRI CHEST W/O & W/DYE: CPT

## 2021-12-07 PROCEDURE — 255N000002 HC RX 255 OP 636: Performed by: RADIOLOGY

## 2021-12-07 RX ORDER — GADOBUTROL 604.72 MG/ML
10 INJECTION INTRAVENOUS ONCE
Status: COMPLETED | OUTPATIENT
Start: 2021-12-07 | End: 2021-12-07

## 2021-12-07 RX ADMIN — GADOBUTROL 10 ML: 604.72 INJECTION INTRAVENOUS at 08:55

## 2021-12-30 ENCOUNTER — HOSPITAL ENCOUNTER (OUTPATIENT)
Facility: HOSPITAL | Age: 31
End: 2021-12-30
Attending: ORTHOPAEDIC SURGERY | Admitting: ORTHOPAEDIC SURGERY

## 2022-01-02 ENCOUNTER — HOSPITAL ENCOUNTER (EMERGENCY)
Facility: HOSPITAL | Age: 32
Discharge: HOME | End: 2022-01-02
Attending: PHYSICIAN ASSISTANT | Admitting: PHYSICIAN ASSISTANT
Payer: COMMERCIAL

## 2022-01-02 VITALS
RESPIRATION RATE: 18 BRPM | TEMPERATURE: 98.3 F | SYSTOLIC BLOOD PRESSURE: 146 MMHG | DIASTOLIC BLOOD PRESSURE: 93 MMHG | OXYGEN SATURATION: 96 % | HEART RATE: 80 BPM

## 2022-01-02 DIAGNOSIS — J22 LOWER RESPIRATORY INFECTION: ICD-10-CM

## 2022-01-02 PROCEDURE — 99213 OFFICE O/P EST LOW 20 MIN: CPT | Performed by: PHYSICIAN ASSISTANT

## 2022-01-02 PROCEDURE — G0463 HOSPITAL OUTPT CLINIC VISIT: HCPCS

## 2022-01-02 RX ORDER — AZITHROMYCIN 250 MG/1
TABLET, FILM COATED ORAL
Qty: 6 TABLET | Refills: 0 | Status: SHIPPED | OUTPATIENT
Start: 2022-01-02 | End: 2022-01-07

## 2022-01-02 RX ORDER — ACETAMINOPHEN 500 MG
500 TABLET ORAL EVERY 6 HOURS PRN
Qty: 120 TABLET | Refills: 0 | COMMUNITY
Start: 2022-01-02 | End: 2022-01-09

## 2022-01-02 RX ORDER — IBUPROFEN 200 MG
200 TABLET ORAL EVERY 6 HOURS PRN
Qty: 120 TABLET | Refills: 0 | COMMUNITY
Start: 2022-01-02

## 2022-01-02 ASSESSMENT — ENCOUNTER SYMPTOMS
DIZZINESS: 0
NECK PAIN: 0
CHILLS: 1
NECK STIFFNESS: 0
COUGH: 1
VOMITING: 0
SINUS PRESSURE: 1
WEAKNESS: 1
FEVER: 1
SINUS PAIN: 1
FATIGUE: 1
SORE THROAT: 0
BRUISES/BLEEDS EASILY: 0
ABDOMINAL PAIN: 0
NAUSEA: 0

## 2022-01-02 NOTE — ED PROVIDER NOTES
History     Chief Complaint   Patient presents with     Nasal Congestion     The history is provided by the patient.     Yulissa Breaux is a 31 year old female who presented to the urgent care ambulatory for evaluation of approximate 10-day history of sinus pain and pressure, as well as productive cough.  Cough has worsened.  Multiple home Covid tests have been negative.  Denies any hemoptysis.  Denies any profound dyspnea.  Denies any diaphoresis.  Denies any chest discomfort.  No history of immunosuppression.  Denies any lower urinary tract symptoms.  Purulent production.    Allergies:  No Known Allergies    Problem List:    There are no problems to display for this patient.       Past Medical History:    No past medical history on file.    Past Surgical History:    Past Surgical History:   Procedure Laterality Date     LAPAROSCOPIC APPENDECTOMY N/A 11/18/2021    Procedure: APPENDECTOMY, LAPAROSCOPIC;  Surgeon: Antonino Manjarrez MD;  Location: [facility]       Family History:    Family History   Problem Relation Age of Onset     Other Cancer Mother      Chronic Obstructive Pulmonary Disease Mother      Hypertension Mother      Asthma Mother      Attention Deficit Disorder Mother      Irritable Bowel Syndrome Mother      Other Cancer Father      Hypertension Father      Alcoholism Father      Thyroid Disease Maternal Grandmother      Multiple myeloma Maternal Grandmother      Coronary Artery Disease Maternal Grandfather      Prostate Cancer Maternal Grandfather        Social History:  Marital Status:   [2]  Social History     Tobacco Use     Smoking status: Never Smoker     Smokeless tobacco: Never Used   Substance Use Topics     Alcohol use: Yes     Comment: occasionally     Drug use: No        Medications:    acetaminophen (TYLENOL) 500 MG tablet  azithromycin (ZITHROMAX) 250 MG tablet  ibuprofen (ADVIL/MOTRIN) 200 MG tablet  medroxyPROGESTERone (DEPO-PROVERA) 150 MG/ML injection          Review of Systems    Constitutional: Positive for chills, fatigue and fever.   HENT: Positive for congestion, sinus pressure and sinus pain. Negative for sore throat.    Respiratory: Positive for cough.    Cardiovascular: Negative for chest pain.   Gastrointestinal: Negative for abdominal pain, nausea and vomiting.   Genitourinary: Negative.    Musculoskeletal: Negative for neck pain and neck stiffness.   Skin: Negative.    Allergic/Immunologic: Negative for immunocompromised state.   Neurological: Positive for weakness. Negative for dizziness.   Hematological: Does not bruise/bleed easily.       Physical Exam   BP: 146/93  Pulse: 80  Temp: 98.3  F (36.8  C)  Resp: 18  SpO2: 96 %      Physical Exam  Vitals and nursing note reviewed.   Constitutional:       General: She is not in acute distress.     Appearance: Normal appearance. She is not ill-appearing, toxic-appearing or diaphoretic.   HENT:      Head: Normocephalic and atraumatic.      Right Ear: Tympanic membrane, ear canal and external ear normal.      Left Ear: Tympanic membrane, ear canal and external ear normal.      Nose: Nose normal.      Mouth/Throat:      Mouth: Mucous membranes are moist.      Pharynx: Oropharynx is clear.   Eyes:      Extraocular Movements: Extraocular movements intact.      Conjunctiva/sclera: Conjunctivae normal.      Pupils: Pupils are equal, round, and reactive to light.   Cardiovascular:      Rate and Rhythm: Normal rate and regular rhythm.   Pulmonary:      Comments: No significant tachypnea, increased work of breathing, respirator stress.  Patient does have an expiratory rhonchi mostly on the right.  Harsh productive cough  Musculoskeletal:      Cervical back: Normal range of motion and neck supple.   Skin:     General: Skin is warm and dry.      Capillary Refill: Capillary refill takes less than 2 seconds.   Neurological:      General: No focal deficit present.      Mental Status: She is alert and oriented to person, place, and time.    Psychiatric:         Mood and Affect: Mood normal.         ED Course                 Procedures              Critical Care time:  none               No results found for this or any previous visit (from the past 24 hour(s)).    Medications - No data to display    Assessments & Plan (with Medical Decision Making)   31-year-old female with worsening respiratory symptoms over the course of 10 days.  Multiple home Covid tests are negative.  Plan to treat accordingly.  Return here for any new or worsening symptoms.  Vital signs are normal.    This document was prepared using a combination of typing and voice generated software.  While every attempt was made for accuracy, spelling and grammatical errors may exist..    I have reviewed the nursing notes.    I have reviewed the findings, diagnosis, plan and need for follow up with the patient.       Discharge Medication List as of 1/2/2022 12:30 PM      START taking these medications    Details   acetaminophen (TYLENOL) 500 MG tablet Take 1 tablet (500 mg) by mouth every 6 hours as needed for pain or fever, Disp-120 tablet, R-0, OTC      azithromycin (ZITHROMAX) 250 MG tablet Take 2 tablets (500 mg) by mouth daily for 1 day, THEN 1 tablet (250 mg) daily for 4 days., Disp-6 tablet, R-0, E-Prescribe             Final diagnoses:   Lower respiratory infection       1/2/2022   [facility]     Keiko Lou PA-C  01/02/22 [id number]

## 2022-01-02 NOTE — DISCHARGE INSTRUCTIONS
Azithromycin as prescribed.    Ibuprofen and Tylenol as needed.    Advance diet as tolerated.    Return here for any new or worsening symptoms.

## 2022-01-05 RX ORDER — HYDROMORPHONE HYDROCHLORIDE 1 MG/ML
0.4 INJECTION, SOLUTION INTRAMUSCULAR; INTRAVENOUS; SUBCUTANEOUS EVERY 5 MIN PRN
Status: CANCELLED | OUTPATIENT
Start: 2022-01-05

## 2022-01-05 RX ORDER — OXYCODONE HYDROCHLORIDE 5 MG/1
5 TABLET ORAL EVERY 4 HOURS PRN
Status: CANCELLED | OUTPATIENT
Start: 2022-01-05

## 2022-01-05 RX ORDER — FENTANYL CITRATE 50 UG/ML
50 INJECTION, SOLUTION INTRAMUSCULAR; INTRAVENOUS
Status: CANCELLED | OUTPATIENT
Start: 2022-01-05

## 2022-01-05 RX ORDER — LIDOCAINE 40 MG/G
CREAM TOPICAL
Status: CANCELLED | OUTPATIENT
Start: 2022-01-05

## 2022-01-05 RX ORDER — SODIUM CHLORIDE, SODIUM LACTATE, POTASSIUM CHLORIDE, CALCIUM CHLORIDE 600; 310; 30; 20 MG/100ML; MG/100ML; MG/100ML; MG/100ML
INJECTION, SOLUTION INTRAVENOUS CONTINUOUS
Status: CANCELLED | OUTPATIENT
Start: 2022-01-05

## 2022-01-05 RX ORDER — ALBUTEROL SULFATE 0.83 MG/ML
2.5 SOLUTION RESPIRATORY (INHALATION) EVERY 4 HOURS PRN
Status: CANCELLED | OUTPATIENT
Start: 2022-01-05

## 2022-01-05 RX ORDER — ONDANSETRON 2 MG/ML
4 INJECTION INTRAMUSCULAR; INTRAVENOUS EVERY 30 MIN PRN
Status: CANCELLED | OUTPATIENT
Start: 2022-01-05

## 2022-01-05 RX ORDER — ONDANSETRON 4 MG/1
4 TABLET, ORALLY DISINTEGRATING ORAL EVERY 30 MIN PRN
Status: CANCELLED | OUTPATIENT
Start: 2022-01-05

## 2022-01-05 RX ORDER — FENTANYL CITRATE 50 UG/ML
50 INJECTION, SOLUTION INTRAMUSCULAR; INTRAVENOUS EVERY 5 MIN PRN
Status: CANCELLED | OUTPATIENT
Start: 2022-01-05

## 2022-01-05 RX ORDER — METOPROLOL TARTRATE 1 MG/ML
1-2 INJECTION, SOLUTION INTRAVENOUS EVERY 5 MIN PRN
Status: CANCELLED | OUTPATIENT
Start: 2022-01-05

## 2022-01-07 DIAGNOSIS — M25.569 KNEE PAIN, UNSPECIFIED CHRONICITY, UNSPECIFIED LATERALITY: ICD-10-CM

## 2022-01-07 DIAGNOSIS — M89.8X1 CLAVICLE PAIN: ICD-10-CM

## 2022-01-07 DIAGNOSIS — M54.17 LUMBOSACRAL RADICULOPATHY: ICD-10-CM

## 2022-01-10 RX ORDER — IBUPROFEN 800 MG/1
TABLET, FILM COATED ORAL
Qty: 60 TABLET | Refills: 2 | Status: SHIPPED | OUTPATIENT
Start: 2022-01-10 | End: 2023-04-20

## 2022-01-10 NOTE — TELEPHONE ENCOUNTER
Ibuprofen       Last Written Prescription Date:  1/2/22  Last Fill Quantity: 120,   # refills: 0  Last Office Visit: 3/22/21  Future Office visit:

## 2022-01-20 ENCOUNTER — ALLIED HEALTH/NURSE VISIT (OUTPATIENT)
Dept: ALLERGY | Facility: OTHER | Age: 32
End: 2022-01-20
Attending: FAMILY MEDICINE
Payer: COMMERCIAL

## 2022-01-20 DIAGNOSIS — Z30.42 ENCOUNTER FOR SURVEILLANCE OF INJECTABLE CONTRACEPTIVE: Primary | ICD-10-CM

## 2022-01-20 PROCEDURE — 96372 THER/PROPH/DIAG INJ SC/IM: CPT

## 2022-01-20 RX ADMIN — MEDROXYPROGESTERONE ACETATE 150 MG: 150 INJECTION, SUSPENSION, EXTENDED RELEASE INTRAMUSCULAR at 10:58

## 2022-01-20 NOTE — PROGRESS NOTES
Clinic Administered Medication Documentation    Administrations This Visit     medroxyPROGESTERone (DEPO-PROVERA) injection 150 mg     Admin Date  01/20/2022 Action  Given Dose  150 mg Route  Intramuscular Site  Right Upper Outer Quadrant Administered By  Dominguez Fernandez RN    Ordering Provider: Olivia Jj MD    Patient Supplied?: No                  Depo Provera Documentation    URINE HCG: not indicated    Depo-Provera Standing Order inclusion/exclusion criteria reviewed.   Patient meets: inclusion criteria     BP: Data Unavailable  LAST PAP/EXAM: No results found for: PAP    Prior to injection, verified patient identity using patient's name and date of birth. Medication was administered. Please see MAR and medication order for additional information.     Was entire vial of medication used? Yes  Vial/Syringe: Single dose vial  Expiration Date:  09/30/2023    Patient instructed to report any adverse reaction to staff immediately .  NEXT INJECTION DUE: 4/7/22 - 4/21/22    Dominguez Fernandez RN on 1/20/2022 at 10:59 AM

## 2022-01-22 ENCOUNTER — HEALTH MAINTENANCE LETTER (OUTPATIENT)
Age: 32
End: 2022-01-22

## 2022-04-18 ENCOUNTER — TELEPHONE (OUTPATIENT)
Dept: FAMILY MEDICINE | Facility: OTHER | Age: 32
End: 2022-04-18
Payer: COMMERCIAL

## 2022-04-18 DIAGNOSIS — Z30.42 ENCOUNTER FOR SURVEILLANCE OF INJECTABLE CONTRACEPTIVE: Primary | ICD-10-CM

## 2022-04-18 RX ORDER — MEDROXYPROGESTERONE ACETATE 150 MG/ML
150 INJECTION, SUSPENSION INTRAMUSCULAR
Status: COMPLETED | OUTPATIENT
Start: 2022-04-18 | End: 2022-12-20

## 2022-04-18 NOTE — TELEPHONE ENCOUNTER
This patient is on the shot room schedule tomorrow for her Depo Provera injection.  The current order has .  Her last office visit with  was 20 and her last appt withJaneth Mcclure 21.  New order updated for review  Please review and sign, if you feel this is appropriate.  Thank You!    Dominguez Fernandez RN on 2022 at 11:42 AM

## 2022-04-19 ENCOUNTER — ALLIED HEALTH/NURSE VISIT (OUTPATIENT)
Dept: ALLERGY | Facility: OTHER | Age: 32
End: 2022-04-19
Attending: FAMILY MEDICINE
Payer: COMMERCIAL

## 2022-04-19 DIAGNOSIS — Z30.42 ENCOUNTER FOR SURVEILLANCE OF INJECTABLE CONTRACEPTIVE: Primary | ICD-10-CM

## 2022-04-19 PROCEDURE — 96372 THER/PROPH/DIAG INJ SC/IM: CPT

## 2022-04-19 RX ADMIN — MEDROXYPROGESTERONE ACETATE 150 MG: 150 INJECTION, SUSPENSION INTRAMUSCULAR at 08:08

## 2022-04-19 NOTE — PROGRESS NOTES
Clinic Administered Medication Documentation    Administrations This Visit     medroxyPROGESTERone (DEPO-PROVERA) injection 150 mg     Admin Date  04/19/2022 Action  Given Dose  150 mg Route  Intramuscular Site  Left Upper Outer Quadrant Administered By  Dominguez Fernandez RN    Ordering Provider: Janeth Krishnamurthy NP    Patient Supplied?: No                  Depo Provera Documentation    URINE HCG: not indicated    Depo-Provera Standing Order inclusion/exclusion criteria reviewed.   Patient meets: inclusion criteria     BP: Data Unavailable  LAST PAP/EXAM: No results found for: PAP    Prior to injection, verified patient identity using patient's name and date of birth. Medication was administered. Please see MAR and medication order for additional information.     Was entire vial of medication used? Yes  Vial/Syringe: Single dose vial  Expiration Date:  09/30/2023    Patient instructed to report any adverse reaction to staff immediately .  NEXT INJECTION DUE: 7/5/22 - 7/19/22    Dominguez Fernandez RN on 4/19/2022 at 8:08 AM

## 2022-07-05 ENCOUNTER — ALLIED HEALTH/NURSE VISIT (OUTPATIENT)
Dept: ALLERGY | Facility: OTHER | Age: 32
End: 2022-07-05
Attending: FAMILY MEDICINE
Payer: COMMERCIAL

## 2022-07-05 DIAGNOSIS — Z30.42 ENCOUNTER FOR SURVEILLANCE OF INJECTABLE CONTRACEPTIVE: Primary | ICD-10-CM

## 2022-07-05 PROCEDURE — 96372 THER/PROPH/DIAG INJ SC/IM: CPT

## 2022-07-05 RX ADMIN — MEDROXYPROGESTERONE ACETATE 150 MG: 150 INJECTION, SUSPENSION INTRAMUSCULAR at 11:23

## 2022-07-05 NOTE — PROGRESS NOTES
Clinic Administered Medication Documentation    Administrations This Visit     medroxyPROGESTERone (DEPO-PROVERA) injection 150 mg     Admin Date  07/05/2022 Action  Given Dose  150 mg Route  Intramuscular Site  Left Upper Outer Quadrant Administered By  Dominguez Patel RN    Ordering Provider: Janeth Krishnamurthy NP    Patient Supplied?: No                  Depo Provera Documentation    URINE HCG: not indicated    Depo-Provera Standing Order inclusion/exclusion criteria reviewed.   Patient meets: inclusion criteria     BP: Data Unavailable  LAST PAP/EXAM: No results found for: PAP    Prior to injection, verified patient identity using patient's name and date of birth. Medication was administered. Please see MAR and medication order for additional information.     Was entire vial of medication used? Yes  Vial/Syringe: Single dose vial  Expiration Date:  09/23/2022    Patient instructed to report any adverse reaction to staff immediately .  NEXT INJECTION DUE: 9/20/22 - 10/4/22    Dominguez Patel RN on 7/5/2022 at 11:24 AM

## 2022-08-08 DIAGNOSIS — M89.8X1 CLAVICLE PAIN: ICD-10-CM

## 2022-08-08 DIAGNOSIS — M25.569 KNEE PAIN, UNSPECIFIED CHRONICITY, UNSPECIFIED LATERALITY: ICD-10-CM

## 2022-08-08 DIAGNOSIS — M54.17 LUMBOSACRAL RADICULOPATHY: ICD-10-CM

## 2022-08-10 RX ORDER — IBUPROFEN 800 MG/1
TABLET, FILM COATED ORAL
Qty: 60 TABLET | Refills: 2 | OUTPATIENT
Start: 2022-08-10

## 2022-08-10 NOTE — TELEPHONE ENCOUNTER
Ibuprofen       Last Written Prescription Date:  1/10/22  Last Fill Quantity: 60,   # refills: 2  Last Office Visit: 3/22/21  Future Office visit:

## 2022-09-04 ENCOUNTER — HEALTH MAINTENANCE LETTER (OUTPATIENT)
Age: 32
End: 2022-09-04

## 2022-10-03 ENCOUNTER — ALLIED HEALTH/NURSE VISIT (OUTPATIENT)
Dept: ALLERGY | Facility: OTHER | Age: 32
End: 2022-10-03
Attending: FAMILY MEDICINE
Payer: COMMERCIAL

## 2022-10-03 DIAGNOSIS — Z30.42 ENCOUNTER FOR SURVEILLANCE OF INJECTABLE CONTRACEPTIVE: Primary | ICD-10-CM

## 2022-10-03 PROCEDURE — 96372 THER/PROPH/DIAG INJ SC/IM: CPT

## 2022-10-03 RX ADMIN — MEDROXYPROGESTERONE ACETATE 150 MG: 150 INJECTION, SUSPENSION INTRAMUSCULAR at 16:01

## 2022-10-03 NOTE — PROGRESS NOTES
Clinic Administered Medication Documentation    Administrations This Visit     medroxyPROGESTERone (DEPO-PROVERA) injection 150 mg     Admin Date  10/03/2022 Action  Given Dose  150 mg Route  Intramuscular Site  Right Upper Outer Quadrant Administered By  Dominguez Patel RN    Ordering Provider: Janeth Krishnamurthy NP    Patient Supplied?: No                  Depo Provera Documentation    URINE HCG: not indicated    Depo-Provera Standing Order inclusion/exclusion criteria reviewed.   Patient meets: inclusion criteria     BP: Data Unavailable  LAST PAP/EXAM: No results found for: PAP    Prior to injection, verified patient identity using patient's name and date of birth. Medication was administered. Please see MAR and medication order for additional information.     Was entire vial of medication used? Yes  Vial/Syringe: Single dose vial  Expiration Date:  05/31/2024    Patient instructed to report any adverse reaction to staff immediately .  NEXT INJECTION DUE: 12/19/22 - 1/2/23    Dominguez Patel RN on 10/3/2022 at 4:02 PM

## 2022-10-18 ENCOUNTER — NURSE TRIAGE (OUTPATIENT)
Dept: FAMILY MEDICINE | Facility: OTHER | Age: 32
End: 2022-10-18
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
## 2022-10-19 ENCOUNTER — ANCILLARY PROCEDURE (OUTPATIENT)
Dept: GENERAL RADIOLOGY | Facility: OTHER | Age: 32
End: 2022-10-19
Attending: STUDENT IN AN ORGANIZED HEALTH CARE EDUCATION/TRAINING PROGRAM
Payer: COMMERCIAL

## 2022-10-19 ENCOUNTER — OFFICE VISIT (OUTPATIENT)
Dept: FAMILY MEDICINE | Facility: OTHER | Age: 32
End: 2022-10-19
Attending: STUDENT IN AN ORGANIZED HEALTH CARE EDUCATION/TRAINING PROGRAM
Payer: COMMERCIAL

## 2022-10-19 VITALS
HEART RATE: 74 BPM | SYSTOLIC BLOOD PRESSURE: 130 MMHG | DIASTOLIC BLOOD PRESSURE: 80 MMHG | BODY MASS INDEX: 38.44 KG/M2 | OXYGEN SATURATION: 98 % | WEIGHT: 231 LBS | TEMPERATURE: 98.6 F | RESPIRATION RATE: 20 BRPM

## 2022-10-19 DIAGNOSIS — M62.838 MUSCLE SPASM: Primary | ICD-10-CM

## 2022-10-19 DIAGNOSIS — M62.838 MUSCLE SPASM: ICD-10-CM

## 2022-10-19 PROCEDURE — 72040 X-RAY EXAM NECK SPINE 2-3 VW: CPT | Mod: TC

## 2022-10-19 PROCEDURE — G0463 HOSPITAL OUTPT CLINIC VISIT: HCPCS

## 2022-10-19 PROCEDURE — 99213 OFFICE O/P EST LOW 20 MIN: CPT | Performed by: STUDENT IN AN ORGANIZED HEALTH CARE EDUCATION/TRAINING PROGRAM

## 2022-10-19 RX ORDER — CYCLOBENZAPRINE HCL 5 MG
5 TABLET ORAL 2 TIMES DAILY
Qty: 20 TABLET | Refills: 0 | Status: SHIPPED | OUTPATIENT
Start: 2022-10-19

## 2022-10-19 ASSESSMENT — PAIN SCALES - GENERAL: PAINLEVEL: SEVERE PAIN (7)
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
## 2022-10-19 NOTE — NURSING NOTE
"Chief Complaint   Patient presents with     Shoulder Pain       Initial /80   Pulse 74   Temp 98.6  F (37  C) (Tympanic)   Resp 20   Wt 104.8 kg (231 lb)   SpO2 98%   BMI 38.44 kg/m   Estimated body mass index is 38.44 kg/m  as calculated from the following:    Height as of 12/3/21: 1.651 m (5' 5\").    Weight as of this encounter: 104.8 kg (231 lb).  Medication Reconciliation: complete  Emily Orona LPN    "

## 2022-12-20 ENCOUNTER — ALLIED HEALTH/NURSE VISIT (OUTPATIENT)
Dept: ALLERGY | Facility: OTHER | Age: 32
End: 2022-12-20
Attending: FAMILY MEDICINE
Payer: COMMERCIAL

## 2022-12-20 DIAGNOSIS — Z30.42 ENCOUNTER FOR SURVEILLANCE OF INJECTABLE CONTRACEPTIVE: Primary | ICD-10-CM

## 2022-12-20 PROCEDURE — 96372 THER/PROPH/DIAG INJ SC/IM: CPT

## 2022-12-20 RX ADMIN — MEDROXYPROGESTERONE ACETATE 150 MG: 150 INJECTION, SUSPENSION INTRAMUSCULAR at 16:00

## 2022-12-20 NOTE — PROGRESS NOTES
Clinic Administered Medication Documentation    Administrations This Visit     medroxyPROGESTERone (DEPO-PROVERA) injection 150 mg     Admin Date  12/20/2022 Action  Given Dose  150 mg Route  Intramuscular Site  Left Upper Outer Quadrant Administered By  Alana Randall RN    Ordering Provider: Janeth Krishnamurthy NP    Patient Supplied?: No                  Depo Provera Documentation    URINE HCG: not indicated    Depo-Provera Standing Order inclusion/exclusion criteria reviewed.   Patient meets: inclusion criteria     BP: Data Unavailable  LAST PAP/EXAM: No results found for: PAP    Prior to injection, verified patient identity using patient's name and date of birth. Medication was administered. Please see MAR and medication order for additional information.     Was entire vial of medication used? Yes  Vial/Syringe: Single dose vial  Expiration Date:  04/2024    Patient instructed to report any adverse reaction to staff immediately .  NEXT INJECTION DUE: 3/7/23 - 3/21/23    Alana Randall RN on 12/20/2022 at 4:01 PM

## 2023-03-08 ENCOUNTER — TELEPHONE (OUTPATIENT)
Dept: FAMILY MEDICINE | Facility: OTHER | Age: 33
End: 2023-03-08

## 2023-03-08 DIAGNOSIS — Z30.42 ENCOUNTER FOR SURVEILLANCE OF INJECTABLE CONTRACEPTIVE: Primary | ICD-10-CM

## 2023-03-08 RX ORDER — MEDROXYPROGESTERONE ACETATE 150 MG/ML
150 INJECTION, SUSPENSION INTRAMUSCULAR
Status: COMPLETED | OUTPATIENT
Start: 2023-03-08 | End: 2023-03-15

## 2023-03-08 NOTE — TELEPHONE ENCOUNTER
This patient is scheduled in the shot room on Wed 03/15/2023 for her Depo Provera injection.  The current order has .  Her last office visit was 10/19/22, with Radha Harper for muscle spasms.   Her last appt with Janeth Krishnamurthy CNP was 21 New order pended for review and and signature.  Please review and sign, if you feel this is appropriate.  Thank you!    Dominguez Patel RN on 3/8/2023 at 1:27 PM

## 2023-03-08 NOTE — TELEPHONE ENCOUNTER
Have not seen her since 2020.  Signed single Depo order.  Needs to be seen - please schedule next available.

## 2023-03-15 ENCOUNTER — ALLIED HEALTH/NURSE VISIT (OUTPATIENT)
Dept: ALLERGY | Facility: OTHER | Age: 33
End: 2023-03-15
Payer: COMMERCIAL

## 2023-03-15 DIAGNOSIS — Z30.42 ENCOUNTER FOR SURVEILLANCE OF INJECTABLE CONTRACEPTIVE: Primary | ICD-10-CM

## 2023-03-15 PROCEDURE — 250N000011 HC RX IP 250 OP 636: Performed by: FAMILY MEDICINE

## 2023-03-15 PROCEDURE — 96372 THER/PROPH/DIAG INJ SC/IM: CPT | Performed by: FAMILY MEDICINE

## 2023-03-15 RX ADMIN — MEDROXYPROGESTERONE ACETATE 150 MG: 150 INJECTION, SUSPENSION INTRAMUSCULAR at 15:39

## 2023-03-15 NOTE — PROGRESS NOTES
Clinic Administered Medication Documentation    Administrations This Visit     medroxyPROGESTERone (DEPO-PROVERA) injection 150 mg     Admin Date  03/15/2023 Action  $Given Dose  150 mg Route  Intramuscular Site  Right Upper Outer Quadrant Administered By  Dominguez Patel RN    Ordering Provider: Olivia Jj MD    Patient Supplied?: No                  Depo Provera Documentation    URINE HCG: not indicated    Depo-Provera Standing Order inclusion/exclusion criteria reviewed.   Patient meets: inclusion criteria     BP: Data Unavailable  LAST PAP/EXAM: No results found for: PAP    Prior to injection, verified patient identity using patient's name and date of birth. Medication was administered. Please see MAR and medication order for additional information.     Was entire vial of medication used? Yes  Vial/Syringe: Single dose vial  Expiration Date:  05/31/2024    Patient instructed to report any adverse reaction to staff immediately .  NEXT INJECTION DUE: 5/31/23 - 6/14/23    Dominguez Patel RN on 3/15/2023 at 3:40 PM

## 2023-04-17 NOTE — PROGRESS NOTES
Assessment & Plan     Cervical cancer screening  Overdue.  Unable to locate any pap other than 1 from 2015 in scanned records.  cannnot locate [place] records from years ago.  Patient was not scheduled for physical today, but provider advised completing pap and she was agreeable.  - A pap thin layer screen with  HPV - recommended age 30 - 65 years    Encounter for surveillance of injectable contraceptive  Tolerating the Depo Provera.  Desires to continue.    Musculoskeletal pain  Variable - intermittent -knees, headache, etc.  Will update labs - CBC, CMP - given fairly frequent NSAID use.  Encouraged complete physical annually and follow up for other concerns.  - ibuprofen (ADVIL/MOTRIN) 800 MG tablet; Take 1 tablet (800 mg) by mouth every 8 hours as needed for moderate pain  - CBC with platelets and differential; Future  - Comprehensive metabolic panel (BMP + Alb, Alk Phos, ALT, AST, Total. Bili, TP); Future  - CBC with platelets and differential  - Comprehensive metabolic panel (BMP + Alb, Alk Phos, ALT, AST, Total. Bili, TP)       See Patient Instructions    Return for annual physical.     31 min spent on visit - exam, history, documentation, orders, and counseling.    Olivia So MD  [facility] - ANNA MARIE Duenas is a 32 year old, presenting for the following health issues:  Contraception    HPI     Last visit .    Concern - Discuss birth control - Depo Provera  Onset: 2018 she started the birth control - recalled to come in   - ages 9 and 7.  Menses - just prior to shot. Lasts 2-3 weeks.  [place] [id number] - abnormal pap.    No vaginal concerns.  Ibuprofen 800 mg daily once per day. Knee pain chronic - stairs, walking.   Headaches - random - bitemporal.  Screen for work.      Review of Systems   Constitutional, HEENT, cardiovascular, pulmonary, gi and gu systems are negative, except as otherwise noted.      Objective    /85 (BP Location: Right arm, Patient  Position: Sitting, Cuff Size: Adult Large)   Pulse 90   Temp 99.4  F (37.4  C) (Tympanic)   Wt 107 kg (235 lb 12.8 oz)   LMP  (LMP Unknown)   SpO2 95%   BMI 39.24 kg/m    Body mass index is 39.24 kg/m .  Physical Exam   GENERAL: healthy, alert and no distress  RESP: lungs clear to auscultation - no rales, rhonchi or wheezes  CV: regular rate and rhythm, normal S1 S2, no S3 or S4, no murmur, click or rub, no peripheral edema and peripheral pulses strong  ABDOMEN: soft, nontender, no hepatosplenomegaly, no masses and bowel sounds normal   (female): normal female external genitalia, normal urethral meatus, vaginal mucosa, normal cervix/adnexa/uterus without masses or discharge  MS: no gross musculoskeletal defects noted, no edema  PSYCH: mentation appears normal, affect normal/bright    Labs pending.

## 2023-04-20 ENCOUNTER — OFFICE VISIT (OUTPATIENT)
Dept: FAMILY MEDICINE | Facility: OTHER | Age: 33
End: 2023-04-20
Attending: FAMILY MEDICINE
Payer: COMMERCIAL

## 2023-04-20 VITALS
DIASTOLIC BLOOD PRESSURE: 85 MMHG | WEIGHT: 235.8 LBS | BODY MASS INDEX: 39.24 KG/M2 | TEMPERATURE: 99.4 F | HEART RATE: 90 BPM | OXYGEN SATURATION: 95 % | SYSTOLIC BLOOD PRESSURE: 130 MMHG

## 2023-04-20 DIAGNOSIS — Z30.42 ENCOUNTER FOR SURVEILLANCE OF INJECTABLE CONTRACEPTIVE: ICD-10-CM

## 2023-04-20 DIAGNOSIS — Z12.4 CERVICAL CANCER SCREENING: Primary | ICD-10-CM

## 2023-04-20 DIAGNOSIS — M79.18 MUSCULOSKELETAL PAIN: ICD-10-CM

## 2023-04-20 DIAGNOSIS — Z30.013 ENCOUNTER FOR INITIAL PRESCRIPTION OF INJECTABLE CONTRACEPTIVE: ICD-10-CM

## 2023-04-20 LAB
ALBUMIN SERPL BCG-MCNC: 4.1 G/DL (ref 3.5–5.2)
ALP SERPL-CCNC: 132 U/L (ref 35–104)
ALT SERPL W P-5'-P-CCNC: 35 U/L (ref 10–35)
ANION GAP SERPL CALCULATED.3IONS-SCNC: 10 MMOL/L (ref 7–15)
AST SERPL W P-5'-P-CCNC: 23 U/L (ref 10–35)
BASOPHILS # BLD AUTO: 0 10E3/UL (ref 0–0.2)
BASOPHILS NFR BLD AUTO: 0 %
BILIRUB SERPL-MCNC: 0.2 MG/DL
BUN SERPL-MCNC: 13.4 MG/DL (ref 6–20)
CALCIUM SERPL-MCNC: 9.5 MG/DL (ref 8.6–10)
CHLORIDE SERPL-SCNC: 104 MMOL/L (ref 98–107)
CREAT SERPL-MCNC: 0.78 MG/DL (ref 0.51–0.95)
DEPRECATED HCO3 PLAS-SCNC: 27 MMOL/L (ref 22–29)
EOSINOPHIL # BLD AUTO: 0 10E3/UL (ref 0–0.7)
EOSINOPHIL NFR BLD AUTO: 1 %
ERYTHROCYTE [DISTWIDTH] IN BLOOD BY AUTOMATED COUNT: 12.2 % (ref 10–15)
GFR SERPL CREATININE-BSD FRML MDRD: >90 ML/MIN/1.73M2
GLUCOSE SERPL-MCNC: 98 MG/DL (ref 70–99)
HCT VFR BLD AUTO: 41.9 % (ref 35–47)
HGB BLD-MCNC: 14 G/DL (ref 11.7–15.7)
IMM GRANULOCYTES # BLD: 0 10E3/UL
IMM GRANULOCYTES NFR BLD: 0 %
LYMPHOCYTES # BLD AUTO: 2.3 10E3/UL (ref 0.8–5.3)
LYMPHOCYTES NFR BLD AUTO: 30 %
MCH RBC QN AUTO: 29.1 PG (ref 26.5–33)
MCHC RBC AUTO-ENTMCNC: 33.4 G/DL (ref 31.5–36.5)
MCV RBC AUTO: 87 FL (ref 78–100)
MONOCYTES # BLD AUTO: 0.5 10E3/UL (ref 0–1.3)
MONOCYTES NFR BLD AUTO: 7 %
NEUTROPHILS # BLD AUTO: 4.7 10E3/UL (ref 1.6–8.3)
NEUTROPHILS NFR BLD AUTO: 62 %
NRBC # BLD AUTO: 0 10E3/UL
NRBC BLD AUTO-RTO: 0 /100
PLATELET # BLD AUTO: 318 10E3/UL (ref 150–450)
POTASSIUM SERPL-SCNC: 3.8 MMOL/L (ref 3.4–5.3)
PROT SERPL-MCNC: 7.8 G/DL (ref 6.4–8.3)
RBC # BLD AUTO: 4.81 10E6/UL (ref 3.8–5.2)
SODIUM SERPL-SCNC: 141 MMOL/L (ref 136–145)
WBC # BLD AUTO: 7.6 10E3/UL (ref 4–11)

## 2023-04-20 PROCEDURE — 85025 COMPLETE CBC W/AUTO DIFF WBC: CPT | Performed by: FAMILY MEDICINE

## 2023-04-20 PROCEDURE — G0124 SCREEN C/V THIN LAYER BY MD: HCPCS | Performed by: PATHOLOGY

## 2023-04-20 PROCEDURE — 36415 COLL VENOUS BLD VENIPUNCTURE: CPT | Performed by: FAMILY MEDICINE

## 2023-04-20 PROCEDURE — G0123 SCREEN CERV/VAG THIN LAYER: HCPCS | Performed by: FAMILY MEDICINE

## 2023-04-20 PROCEDURE — 80053 COMPREHEN METABOLIC PANEL: CPT | Performed by: FAMILY MEDICINE

## 2023-04-20 PROCEDURE — 87624 HPV HI-RISK TYP POOLED RSLT: CPT | Performed by: FAMILY MEDICINE

## 2023-04-20 PROCEDURE — 99214 OFFICE O/P EST MOD 30 MIN: CPT | Performed by: FAMILY MEDICINE

## 2023-04-20 RX ORDER — MEDROXYPROGESTERONE ACETATE 150 MG/ML
150 INJECTION, SUSPENSION INTRAMUSCULAR
Qty: 1 ML | Refills: 3 | COMMUNITY
Start: 2023-04-20

## 2023-04-20 RX ORDER — IBUPROFEN 800 MG/1
800 TABLET, FILM COATED ORAL EVERY 8 HOURS PRN
Qty: 60 TABLET | Refills: 2 | Status: SHIPPED | OUTPATIENT
Start: 2023-04-20 | End: 2023-11-13

## 2023-04-20 ASSESSMENT — PAIN SCALES - GENERAL: PAINLEVEL: MODERATE PAIN (4)

## 2023-04-20 NOTE — PATIENT INSTRUCTIONS
Will call with lab and pap/hpv results.  Ibuprofen refilled.  Encourage annual physical to update health care screenings/maintenance.

## 2023-04-28 LAB
BKR LAB AP GYN ADEQUACY: NORMAL
BKR LAB AP GYN INTERPRETATION: NORMAL
BKR LAB AP HPV REFLEX: NORMAL
BKR LAB AP PREVIOUS ABNL DX: NORMAL
BKR LAB AP PREVIOUS ABNORMAL: NORMAL
PATH REPORT.COMMENTS IMP SPEC: NORMAL
PATH REPORT.COMMENTS IMP SPEC: NORMAL
PATH REPORT.RELEVANT HX SPEC: NORMAL

## 2023-04-29 ENCOUNTER — HEALTH MAINTENANCE LETTER (OUTPATIENT)
Age: 33
End: 2023-04-29

## 2023-05-01 LAB
HUMAN PAPILLOMA VIRUS 16 DNA: NEGATIVE
HUMAN PAPILLOMA VIRUS 18 DNA: NEGATIVE
HUMAN PAPILLOMA VIRUS FINAL DIAGNOSIS: ABNORMAL
HUMAN PAPILLOMA VIRUS OTHER HR: POSITIVE

## 2023-06-09 ENCOUNTER — ALLIED HEALTH/NURSE VISIT (OUTPATIENT)
Dept: ALLERGY | Facility: OTHER | Age: 33
End: 2023-06-09
Attending: FAMILY MEDICINE
Payer: COMMERCIAL

## 2023-06-09 DIAGNOSIS — Z30.42 ENCOUNTER FOR SURVEILLANCE OF INJECTABLE CONTRACEPTIVE: Primary | ICD-10-CM

## 2023-06-09 PROCEDURE — 96372 THER/PROPH/DIAG INJ SC/IM: CPT | Performed by: FAMILY MEDICINE

## 2023-06-09 RX ORDER — MEDROXYPROGESTERONE ACETATE 150 MG/ML
150 INJECTION, SUSPENSION INTRAMUSCULAR
Status: ACTIVE | OUTPATIENT
Start: 2023-06-09 | End: 2024-06-03

## 2023-06-09 RX ADMIN — MEDROXYPROGESTERONE ACETATE 150 MG: 150 INJECTION, SUSPENSION INTRAMUSCULAR at 11:00

## 2023-06-09 NOTE — PROGRESS NOTES
Clinic Administered Medication Documentation      Depo Provera Documentation    Depo-Provera Standing Order inclusion/exclusion criteria reviewed.     Is this the initial or subsequent dose of Depo Provera? Subsequent dose - patient is within the acceptable window of time (11-15 weeks) for subsequent injection. Pregnancy test not indicated.    Patient meets: inclusion criteria     Is there an active order (written within the past 365 days, with administrations remaining, not ) in the chart? Yes.     Prior to injection, verified patient identity using patient's name and date of birth. Medication was administered. Please see MAR and medication order for additional information.     Vial/Syringe: Single dose vial. Was entire vial of medication used? Yes    Patient instructed to report any adverse reaction to staff immediately and remain in clinic for 15 minutes and report any adverse reaction to staff immediately but patient declined.  NEXT INJECTION DUE: 23 - 23  Left Gluteus Dickson  Verified that the patient has refills remaining in their prescription.  MIGUELINA ESQUEDA LPN

## 2023-09-06 ENCOUNTER — ALLIED HEALTH/NURSE VISIT (OUTPATIENT)
Dept: ALLERGY | Facility: OTHER | Age: 33
End: 2023-09-06
Attending: FAMILY MEDICINE
Payer: COMMERCIAL

## 2023-09-06 DIAGNOSIS — Z30.42 ENCOUNTER FOR SURVEILLANCE OF INJECTABLE CONTRACEPTIVE: Primary | ICD-10-CM

## 2023-09-06 PROCEDURE — 96372 THER/PROPH/DIAG INJ SC/IM: CPT | Performed by: FAMILY MEDICINE

## 2023-09-06 RX ORDER — MEDROXYPROGESTERONE ACETATE 150 MG/ML
150 INJECTION, SUSPENSION INTRAMUSCULAR
Status: ACTIVE | OUTPATIENT
Start: 2023-09-06

## 2023-09-06 RX ADMIN — MEDROXYPROGESTERONE ACETATE 150 MG: 150 INJECTION, SUSPENSION INTRAMUSCULAR at 15:21

## 2023-09-06 NOTE — PROGRESS NOTES
Follow Up Injection    Patient returning during stated date range given at previous visit: Yes      If here at the correct interval:   BP Readings from Last 1 Encounters:   04/20/23 130/85     Wt Readings from Last 1 Encounters:   04/20/23 107 kg (235 lb 12.8 oz)       Last Pap/exam date: y      Side effects or problems with last injection?  No.  Date range is given to patient for next dose: y    See Medication Note for administration information    Staff Sig: y

## 2023-09-06 NOTE — PROGRESS NOTES
Clinic Administered Medication Documentation      Depo Provera Documentation    Depo-Provera Standing Order inclusion/exclusion criteria reviewed.     Is this the initial or subsequent dose of Depo Provera? Subsequent dose - patient is within the acceptable window of time (11-15 weeks) for subsequent injection. Pregnancy test not indicated.    Patient meets: inclusion criteria     Is there an active order (written within the past 365 days, with administrations remaining, not ) in the chart? Yes.     Prior to injection, verified patient identity using patient's name and date of birth. Medication was administered. Please see MAR and medication order for additional information.     Vial/Syringe: Single dose vial. Was entire vial of medication used? Yes    Patient instructed to report any adverse reaction to staff immediately.  NEXT INJECTION DUE: 23 - 23    Verified that the patient has refills remaining in their prescription.

## 2023-11-12 DIAGNOSIS — M79.18 MUSCULOSKELETAL PAIN: ICD-10-CM

## 2023-11-13 RX ORDER — IBUPROFEN 800 MG/1
800 TABLET, FILM COATED ORAL EVERY 8 HOURS PRN
Qty: 60 TABLET | Refills: 2 | Status: SHIPPED | OUTPATIENT
Start: 2023-11-13 | End: 2024-07-03

## 2023-12-06 ENCOUNTER — ALLIED HEALTH/NURSE VISIT (OUTPATIENT)
Dept: ALLERGY | Facility: OTHER | Age: 33
End: 2023-12-06
Attending: FAMILY MEDICINE
Payer: COMMERCIAL

## 2023-12-06 DIAGNOSIS — Z30.42 ENCOUNTER FOR SURVEILLANCE OF INJECTABLE CONTRACEPTIVE: Primary | ICD-10-CM

## 2023-12-06 PROCEDURE — 96372 THER/PROPH/DIAG INJ SC/IM: CPT | Performed by: FAMILY MEDICINE

## 2023-12-06 RX ORDER — MEDROXYPROGESTERONE ACETATE 150 MG/ML
150 INJECTION, SUSPENSION INTRAMUSCULAR
Status: ACTIVE | OUTPATIENT
Start: 2023-12-06

## 2023-12-06 RX ADMIN — MEDROXYPROGESTERONE ACETATE 150 MG: 150 INJECTION, SUSPENSION INTRAMUSCULAR at 16:18

## 2023-12-06 NOTE — PROGRESS NOTES
Clinic Administered Medication Documentation      Depo Provera Documentation    Depo-Provera Standing Order inclusion/exclusion criteria reviewed.     Is this the initial or subsequent dose of Depo Provera? Subsequent dose - patient is within the acceptable window of time (11-15 weeks) for subsequent injection. Pregnancy test not indicated.    Patient meets: inclusion criteria     Is there an active order (written within the past 365 days, with administrations remaining, not ) in the chart? Yes.     Prior to injection, verified patient identity using patient's name and date of birth. Medication was administered. Please see MAR and medication order for additional information.     Vial/Syringe: Single dose vial. Was entire vial of medication used? Yes    Patient instructed to report any adverse reaction to staff immediately.  NEXT INJECTION DUE: 24 - 3/20/24    Verified that the patient has refills remaining in their prescription.

## 2024-02-26 ENCOUNTER — ALLIED HEALTH/NURSE VISIT (OUTPATIENT)
Dept: ALLERGY | Facility: OTHER | Age: 34
End: 2024-02-26
Attending: FAMILY MEDICINE
Payer: COMMERCIAL

## 2024-02-26 DIAGNOSIS — Z30.42 ENCOUNTER FOR SURVEILLANCE OF INJECTABLE CONTRACEPTIVE: Primary | ICD-10-CM

## 2024-02-26 PROCEDURE — 96372 THER/PROPH/DIAG INJ SC/IM: CPT | Performed by: FAMILY MEDICINE

## 2024-02-26 RX ADMIN — MEDROXYPROGESTERONE ACETATE 150 MG: 150 INJECTION, SUSPENSION INTRAMUSCULAR at 16:04

## 2024-02-26 NOTE — PROGRESS NOTES
Clinic Administered Medication Documentation      Depo Provera Documentation    Depo-Provera Standing Order inclusion/exclusion criteria reviewed.     Is this the initial or subsequent dose of Depo Provera? Subsequent dose - patient is within the acceptable window of time (11-15 weeks) for subsequent injection. Pregnancy test not indicated.    Patient meets: inclusion criteria     Is there an active order (written within the past 365 days, with administrations remaining, not ) in the chart? Yes.     Prior to injection, verified patient identity using patient's name and date of birth. Medication was administered. Please see MAR and medication order for additional information.     Vial/Syringe: Single dose vial. Was entire vial of medication used? Yes    Patient instructed to report any adverse reaction to staff immediately.  NEXT INJECTION DUE: 24 - 6/10/24    Verified that the patient has refills remaining in their prescription.

## 2024-05-20 ENCOUNTER — ALLIED HEALTH/NURSE VISIT (OUTPATIENT)
Dept: ALLERGY | Facility: OTHER | Age: 34
End: 2024-05-20
Attending: FAMILY MEDICINE
Payer: COMMERCIAL

## 2024-05-20 DIAGNOSIS — Z30.42 ENCOUNTER FOR SURVEILLANCE OF INJECTABLE CONTRACEPTIVE: Primary | ICD-10-CM

## 2024-05-20 PROCEDURE — 96372 THER/PROPH/DIAG INJ SC/IM: CPT | Performed by: FAMILY MEDICINE

## 2024-05-20 RX ADMIN — MEDROXYPROGESTERONE ACETATE 150 MG: 150 INJECTION, SUSPENSION INTRAMUSCULAR at 14:07

## 2024-06-04 ENCOUNTER — APPOINTMENT (OUTPATIENT)
Dept: GENERAL RADIOLOGY | Facility: HOSPITAL | Age: 34
End: 2024-06-04
Payer: COMMERCIAL

## 2024-06-04 ENCOUNTER — HOSPITAL ENCOUNTER (EMERGENCY)
Facility: HOSPITAL | Age: 34
Discharge: HOME | End: 2024-06-04
Payer: COMMERCIAL

## 2024-06-04 VITALS
DIASTOLIC BLOOD PRESSURE: 84 MMHG | SYSTOLIC BLOOD PRESSURE: 130 MMHG | TEMPERATURE: 98.4 F | HEART RATE: 80 BPM | OXYGEN SATURATION: 98 % | RESPIRATION RATE: 19 BRPM

## 2024-06-04 DIAGNOSIS — S63.619A: ICD-10-CM

## 2024-06-04 PROCEDURE — 99213 OFFICE O/P EST LOW 20 MIN: CPT

## 2024-06-04 PROCEDURE — G0463 HOSPITAL OUTPT CLINIC VISIT: HCPCS

## 2024-06-04 PROCEDURE — 73140 X-RAY EXAM OF FINGER(S): CPT | Mod: RT

## 2024-06-04 ASSESSMENT — ENCOUNTER SYMPTOMS
WOUND: 0
CHILLS: 0
NUMBNESS: 0
FEVER: 0
COLOR CHANGE: 1
ACTIVITY CHANGE: 1
ARTHRALGIAS: 1
JOINT SWELLING: 1

## 2024-06-04 ASSESSMENT — COLUMBIA-SUICIDE SEVERITY RATING SCALE - C-SSRS
2. HAVE YOU ACTUALLY HAD ANY THOUGHTS OF KILLING YOURSELF IN THE PAST MONTH?: NO
6. HAVE YOU EVER DONE ANYTHING, STARTED TO DO ANYTHING, OR PREPARED TO DO ANYTHING TO END YOUR LIFE?: NO
1. IN THE PAST MONTH, HAVE YOU WISHED YOU WERE DEAD OR WISHED YOU COULD GO TO SLEEP AND NOT WAKE UP?: NO

## 2024-06-04 ASSESSMENT — ACTIVITIES OF DAILY LIVING (ADL): ADLS_ACUITY_SCORE: 33

## 2024-06-04 NOTE — DISCHARGE INSTRUCTIONS
You can take 650-1000mg of tylenol every 6 hours as needed, max of 3000mg in 24 hours and 600-800mg of ibuprofen every 8 hours as needed, max of 2400mg in 24 hours.     Ice for 15-20 minutes every 2-3 hours. Please make sure to protect skin to prevent frost bite.     Wear splint to support finger.     Return with any increased pain or other concerns.

## 2024-06-04 NOTE — ED TRIAGE NOTES
C/o finger injury     Was trying to catch her chicken when she tripped and caught her self with her right hand pinky over the weekend   Most pain is to the first knuckle.  Did have a brace    Tyl, ibu, heat/ice

## 2024-06-04 NOTE — ED PROVIDER NOTES
History     Chief Complaint   Patient presents with    Hand Pain     HPI  Yulissa Breaux is a 34 year old female who presents to the urgent care with complaints of pain in right 5th finger after falling while bending down. Incident occurred over the weekend. She denies numbness/tingling, fevers, chills, and previous injuries to right hand. Right hand dominant. Has tried tylenol, ibuprofen, ice, and heat with minimal change.     Allergies:  No Known Allergies    Problem List:    There are no problems to display for this patient.       Past Medical History:    No past medical history on file.    Past Surgical History:    Past Surgical History:   Procedure Laterality Date    LAPAROSCOPIC APPENDECTOMY N/A 11/18/2021    Procedure: APPENDECTOMY, LAPAROSCOPIC;  Surgeon: Antonino Manjarrez MD;  Location: [facility] OR       Family History:    Family History   Problem Relation Age of Onset    Other Cancer Mother     Chronic Obstructive Pulmonary Disease Mother     Hypertension Mother     Asthma Mother     Attention Deficit Disorder Mother     Irritable Bowel Syndrome Mother     Other Cancer Father     Hypertension Father     Alcoholism Father     Thyroid Disease Maternal Grandmother     Multiple myeloma Maternal Grandmother     Coronary Artery Disease Maternal Grandfather     Prostate Cancer Maternal Grandfather        Social History:  Marital Status:   [2]  Social History     Tobacco Use    Smoking status: Never    Smokeless tobacco: Never   Vaping Use    Vaping status: Never Used   Substance Use Topics    Alcohol use: Yes     Comment: occasionally    Drug use: No        Medications:    ibuprofen (ADVIL/MOTRIN) 800 MG tablet  cyclobenzaprine (FLEXERIL) 5 MG tablet  ibuprofen (ADVIL/MOTRIN) 200 MG tablet  medroxyPROGESTERone (DEPO-PROVERA) 150 MG/ML IM injection          Review of Systems   Constitutional:  Positive for activity change. Negative for chills and fever.   Musculoskeletal:  Positive for arthralgias and joint  swelling.   Skin:  Positive for color change. Negative for pallor, rash and wound.   Neurological:  Negative for numbness.   All other systems reviewed and are negative.      Physical Exam   BP: 130/84  Pulse: 80  Temp: 98.4  F (36.9  C)  Resp: 19  SpO2: 98 %      Physical Exam  Vitals and nursing note reviewed.   Constitutional:       General: She is not in acute distress.     Appearance: Normal appearance. She is not ill-appearing or toxic-appearing.   Musculoskeletal:         General: Swelling, tenderness and signs of injury present. No deformity.      Right wrist: No swelling, tenderness, bony tenderness or snuff box tenderness. Normal range of motion. Normal pulse.      Right hand: Swelling, tenderness and bony tenderness present. No deformity or lacerations. Decreased range of motion. Normal sensation. Normal capillary refill. Normal pulse.        Hands:    Skin:     General: Skin is warm and dry.      Coloration: Skin is not pale.      Findings: Bruising present. No erythema.   Neurological:      Mental Status: She is alert.         ED Course        Procedures       Results for orders placed or performed during the hospital encounter of 06/04/24 (from the past 24 hour(s))   XR Finger Port Right G/E 2 Views    Narrative    Exam: XR FINGER PORT RIGHT G/E 2 VIEWS    Technique: Right fifth finger, 2 views    Comparison: None.    Exam reason: 5th finger pain after fall    Findings:  No acute fracture or dislocation. Joint spaces are well maintained.      Soft tissues appear normal.      Impression    Impression:  No acute fracture or dislocation.    CHINA ANGELA MD         SYSTEM ID:  [id number]       Medications - No data to display    Assessments & Plan (with Medical Decision Making)     I have reviewed the nursing notes.    I have reviewed the findings, diagnosis, plan and need for follow up with the patient.  Yulissa Breaux is a 34 year old female who presents to the urgent care with complaints of pain in  right 5th finger after falling while bending down. Incident occurred over the weekend. She denies numbness/tingling, fevers, chills, and previous injuries to right hand. Right hand dominant. Has tried tylenol, ibuprofen, ice, and heat with minimal change.     MDM: vital signs normal, afebrile. Non toxic in appearance with no noted distress. Strong pulses to right upper extremity. Cap refill within 2 seconds. Swelling with mild bruising throughout right 5th finger. XR reviewed with no acute fracture, per radiologist. Removable finger splint placed. Supportive measures and return precautions discussed. She is in agreement with plan.     (S69.460F) Sprain of finger, right  Plan: You can take 650-1000mg of tylenol every 6 hours as needed, max of 3000mg in 24 hours and 600-800mg of ibuprofen every 8 hours as needed, max of 2400mg in 24 hours.     Ice for 15-20 minutes every 2-3 hours. Please make sure to protect skin to prevent frost bite.     Wear splint to support finger.     Return with any increased pain or other concerns. Understanding verbalized.       New Prescriptions    No medications on file       Final diagnoses:   Sprain of finger, right       6/4/2024   [facility]       Catina Moran NP  06/04/24 [id number]

## 2024-07-02 DIAGNOSIS — M79.18 MUSCULOSKELETAL PAIN: ICD-10-CM

## 2024-07-03 RX ORDER — IBUPROFEN 800 MG/1
800 TABLET, FILM COATED ORAL EVERY 8 HOURS PRN
Qty: 60 TABLET | Refills: 2 | Status: SHIPPED | OUTPATIENT
Start: 2024-07-03

## 2024-07-03 NOTE — TELEPHONE ENCOUNTER
NSAID Medications Failed      Normal CBC on file in past 12 months        Recent Labs   Lab Test 04/20/23  1708   WBC 7.6   RBC 4.81   HGB 14.0   HCT 41.9              Always Fail Criteria - Chart Review Required    Validate Diagnosis. If the medication is requested for an acute flare of a chronic pain associated with a musculoskeletal or rheumatologic condition; okay to authorize if all other criteria are met. If not, then forward to provider for review.    Normal GFR on file in past 12 months          Recent Labs   Lab Test 04/20/23  1708 11/17/21  1839 09/14/20  0845   GFRESTIMATED >90   < > >90   GFRESTBLACK  --   --  >90    < > = values in this interval not displayed.       Recent (12 mo) or future (90 days) visit within the authorizing provider's specialty    The patient must have completed an in-person or virtual visit within the past 12 months or has a future visit scheduled within the next 90 days with the authorizing provider s specialty.  Urgent care and e-visits do not quality as an office visit for this protocol.

## 2024-07-03 NOTE — TELEPHONE ENCOUNTER
ibuprofen (ADVIL/MOTRIN) 800 MG tablet       Last Written Prescription Date:  11/13/23  Last Fill Quantity: 60,   # refills: 2  Last Office Visit: 04/20/23  Future Office visit:       Routing refill request to provider for review/approval because:

## 2024-07-06 ENCOUNTER — HEALTH MAINTENANCE LETTER (OUTPATIENT)
Age: 34
End: 2024-07-06

## 2024-08-20 ENCOUNTER — ALLIED HEALTH/NURSE VISIT (OUTPATIENT)
Dept: ALLERGY | Facility: OTHER | Age: 34
End: 2024-08-20
Attending: FAMILY MEDICINE
Payer: COMMERCIAL

## 2024-08-20 DIAGNOSIS — Z30.42 ENCOUNTER FOR SURVEILLANCE OF INJECTABLE CONTRACEPTIVE: Primary | ICD-10-CM

## 2024-08-20 PROCEDURE — 96372 THER/PROPH/DIAG INJ SC/IM: CPT | Performed by: FAMILY MEDICINE

## 2024-08-20 RX ADMIN — MEDROXYPROGESTERONE ACETATE 150 MG: 150 INJECTION, SUSPENSION INTRAMUSCULAR at 16:08

## 2024-08-20 NOTE — PROGRESS NOTES
Clinic Administered Medication Documentation      Depo Provera Documentation    Depo-Provera Standing Order inclusion/exclusion criteria reviewed.     Is this the initial or subsequent dose of Depo Provera? Subsequent dose - patient is within the acceptable window of time (11-15 weeks) for subsequent injection. Pregnancy test not indicated.    Patient meets: inclusion criteria     Is there an active order (written within the past 365 days, with administrations remaining, not ) in the chart? Yes.     Prior to injection, verified patient identity using patient's name and date of birth. Medication was administered. Please see MAR and medication order for additional information.     Vial/Syringe: Single dose vial. Was entire vial of medication used? Yes    Patient instructed to report any adverse reaction to staff immediately.  NEXT INJECTION DUE: 24 - 12/3/24    Verified that the patient has refills remaining in their prescription.

## 2024-11-20 ENCOUNTER — ALLIED HEALTH/NURSE VISIT (OUTPATIENT)
Dept: ALLERGY | Facility: OTHER | Age: 34
End: 2024-11-20
Attending: FAMILY MEDICINE
Payer: COMMERCIAL

## 2024-11-20 DIAGNOSIS — Z30.42 ENCOUNTER FOR SURVEILLANCE OF INJECTABLE CONTRACEPTIVE: Primary | ICD-10-CM

## 2024-11-20 RX ADMIN — MEDROXYPROGESTERONE ACETATE 150 MG: 150 INJECTION, SUSPENSION INTRAMUSCULAR at 14:24

## 2024-11-20 NOTE — PROGRESS NOTES
Clinic Administered Medication Documentation      Depo Provera Documentation    Depo-Provera Standing Order inclusion/exclusion criteria reviewed.     Is this the initial or subsequent dose of Depo Provera? Subsequent dose - patient is within the acceptable window of time (11-15 weeks) for subsequent injection. Pregnancy test not indicated.    Patient meets: inclusion criteria     Is there an active order (written within the past 365 days, with administrations remaining, not ) in the chart? Yes.     Prior to injection, verified patient identity using patient's name and date of birth. Medication was administered. Please see MAR and medication order for additional information.     Vial/Syringe: Single dose vial. Was entire vial of medication used? Yes    Patient instructed to report any adverse reaction to staff immediately.  NEXT INJECTION DUE: 25 - 3/5/25    Verified that the patient has refills remaining in their prescription.

## 2024-12-10 NOTE — TELEPHONE ENCOUNTER
"Pt called requesting pain meds for post op pain at surgical site. Pt reports incision is healing nicely no concerns.   Pt reports IBU not helping pain. Pt requesting \"stronger medication for pain, but not norco\".   Pt requesting tramadol med pended to surgery department to advise.   "
obese

## 2025-02-11 ENCOUNTER — ALLIED HEALTH/NURSE VISIT (OUTPATIENT)
Dept: ALLERGY | Facility: OTHER | Age: 35
End: 2025-02-11
Attending: FAMILY MEDICINE
Payer: COMMERCIAL

## 2025-02-11 DIAGNOSIS — Z30.42 ENCOUNTER FOR SURVEILLANCE OF INJECTABLE CONTRACEPTIVE: Primary | ICD-10-CM

## 2025-02-11 RX ORDER — MEDROXYPROGESTERONE ACETATE 150 MG/ML
150 INJECTION, SUSPENSION INTRAMUSCULAR
Status: ACTIVE | OUTPATIENT
Start: 2025-02-11

## 2025-02-11 RX ADMIN — MEDROXYPROGESTERONE ACETATE 150 MG: 150 INJECTION, SUSPENSION INTRAMUSCULAR at 15:52

## 2025-02-11 NOTE — PROGRESS NOTES
Follow Up Injection    Patient returning during stated date range given at previous visit: Yes      If here at the correct interval:   BP Readings from Last 1 Encounters:   06/04/24 130/84     Wt Readings from Last 1 Encounters:   04/20/23 107 kg (235 lb 12.8 oz)       Last Pap/exam date: -      Side effects or problems with last injection?  No.  Date range is given to patient for next dose: 04/29/25-05/27/25    See Medication Note for administration information    Staff Sig: Margie Cavanaugh. NGUYỄN

## 2025-02-11 NOTE — PROCEDURES
Clinic Administered Medication Documentation      Depo Provera Documentation    Depo-Provera Standing Order inclusion/exclusion criteria reviewed.     Is this the initial or subsequent dose of Depo Provera? Subsequent dose - patient is within the acceptable window of time (11-15 weeks) for subsequent injection. Pregnancy test not indicated.    Patient meets: inclusion criteria     Is there an active order (written within the past 365 days, with administrations remaining, not ) in the chart? Yes.     Prior to injection, verified patient identity using patient's name and date of birth. Medication was administered. Please see MAR and medication order for additional information.     Vial/Syringe: Single dose vial. Was entire vial of medication used? Yes    Patient instructed to remain in clinic for 15 minutes, report any adverse reaction to staff immediately, and remain in clinic for 15 minutes and report any adverse reaction to staff immediately but patient declined.  NEXT INJECTION DUE: 25 - 25    Verified that the patient has refills remaining in their prescription.

## 2025-05-06 ENCOUNTER — ALLIED HEALTH/NURSE VISIT (OUTPATIENT)
Dept: ALLERGY | Facility: OTHER | Age: 35
End: 2025-05-06
Attending: FAMILY MEDICINE
Payer: COMMERCIAL

## 2025-05-06 DIAGNOSIS — Z30.42 ENCOUNTER FOR SURVEILLANCE OF INJECTABLE CONTRACEPTIVE: Primary | ICD-10-CM

## 2025-05-06 RX ADMIN — MEDROXYPROGESTERONE ACETATE 150 MG: 150 INJECTION, SUSPENSION INTRAMUSCULAR at 15:37

## 2025-07-13 ENCOUNTER — HEALTH MAINTENANCE LETTER (OUTPATIENT)
Age: 35
End: 2025-07-13

## (undated) DEVICE — RELOAD-WHITE 35MM VASCULAR ECHELON

## (undated) DEVICE — RELOAD-WHITE 45MM ECHELON ENDOPATH

## (undated) DEVICE — LIGASURE-5MM BLUNT TIP LAPAROSCOPIC

## (undated) DEVICE — GLV-7.5 BIOGEL LATEX

## (undated) DEVICE — SCD SLEEVE-KNEE REG.

## (undated) DEVICE — CLEARIFY VISUALIZATION SYSTEM (SCOPE WARMER)

## (undated) DEVICE — PACK-BASIN SET-UP

## (undated) DEVICE — CORD-LAPAROSCOPIC MONOPOLAR-DISPOSABLE

## (undated) DEVICE — INZII RETRIEVAL SYSTEM-10MM

## (undated) DEVICE — IRRIGATION-H2O 1000ML BAG

## (undated) DEVICE — TUBING-INSUFFLATION/LAPAROFLATOR W/FILTER

## (undated) DEVICE — IRRIGATION-NACL 1000ML

## (undated) DEVICE — SUTURE-VICRYL 0 UR-6 J603H

## (undated) DEVICE — APPLICATOR-CHLORAPREP 26ML TINTED CHG 2%+ 70% IPA-SURGICAL

## (undated) DEVICE — CANISTER-SUCTION 2000CC

## (undated) DEVICE — TROCAR SLEEVE-KII 5X100MM

## (undated) DEVICE — TROCAR-12X100MM KII FIOS

## (undated) DEVICE — BDG-STAT STRIP

## (undated) DEVICE — RELOAD-BLUE 45MM ECHELON ENDOPATH

## (undated) DEVICE — TROCAR-5X100MM FIOS BLADELESS

## (undated) DEVICE — WANDS-INSULSCAN

## (undated) DEVICE — TUBE-SALEM SUMP 18FR STOMACH SUCTION

## (undated) DEVICE — LIGHT HANDLE COVER FOR SKYTRON LIGHTS

## (undated) DEVICE — LABEL-STERILE PREPRINTED FOR OR

## (undated) DEVICE — STAPLER-35MM VASCULAR ECHELON FLEX

## (undated) DEVICE — CAUTERY-LAP L-HOOK W/SHAFT 33CM

## (undated) DEVICE — LINEAR CUTTER-45MM ECHELON FLEX ARTICULATING

## (undated) DEVICE — PUNCTURE CLOSURE DEVICE

## (undated) DEVICE — BLANKET-BAIR UPPER BODY

## (undated) DEVICE — PACK-LAPAROSCOPY-CUSTOM

## (undated) DEVICE — CAUTERY PAD-POLYHESIVE II ADULT

## (undated) RX ORDER — MEDROXYPROGESTERONE ACETATE 150 MG/ML
INJECTION, SUSPENSION INTRAMUSCULAR
Status: DISPENSED
Start: 2024-05-20

## (undated) RX ORDER — FENTANYL CITRATE 50 UG/ML
INJECTION, SOLUTION INTRAMUSCULAR; INTRAVENOUS
Status: DISPENSED
Start: 2021-11-18

## (undated) RX ORDER — MEDROXYPROGESTERONE ACETATE 150 MG/ML
INJECTION, SUSPENSION INTRAMUSCULAR
Status: DISPENSED
Start: 2024-02-26

## (undated) RX ORDER — MEDROXYPROGESTERONE ACETATE 150 MG/ML
INJECTION, SUSPENSION INTRAMUSCULAR
Status: DISPENSED
Start: 2024-08-20

## (undated) RX ORDER — DEXAMETHASONE SODIUM PHOSPHATE 10 MG/ML
INJECTION, SOLUTION INTRAMUSCULAR; INTRAVENOUS
Status: DISPENSED
Start: 2021-11-18

## (undated) RX ORDER — PROPOFOL 10 MG/ML
INJECTION, EMULSION INTRAVENOUS
Status: DISPENSED
Start: 2021-11-18

## (undated) RX ORDER — LIDOCAINE HYDROCHLORIDE 20 MG/ML
INJECTION, SOLUTION EPIDURAL; INFILTRATION; INTRACAUDAL; PERINEURAL
Status: DISPENSED
Start: 2021-11-18

## (undated) RX ORDER — MEDROXYPROGESTERONE ACETATE 150 MG/ML
INJECTION, SUSPENSION INTRAMUSCULAR
Status: DISPENSED
Start: 2025-02-11

## (undated) RX ORDER — MEDROXYPROGESTERONE ACETATE 150 MG/ML
INJECTION, SUSPENSION INTRAMUSCULAR
Status: DISPENSED
Start: 2023-09-06

## (undated) RX ORDER — MEDROXYPROGESTERONE ACETATE 150 MG/ML
INJECTION, SUSPENSION INTRAMUSCULAR
Status: DISPENSED
Start: 2024-11-20

## (undated) RX ORDER — ONDANSETRON 2 MG/ML
INJECTION INTRAMUSCULAR; INTRAVENOUS
Status: DISPENSED
Start: 2021-11-18

## (undated) RX ORDER — MEDROXYPROGESTERONE ACETATE 150 MG/ML
INJECTION, SUSPENSION INTRAMUSCULAR
Status: DISPENSED
Start: 2025-05-06

## (undated) RX ORDER — MEDROXYPROGESTERONE ACETATE 150 MG/ML
INJECTION, SUSPENSION INTRAMUSCULAR
Status: DISPENSED
Start: 2023-12-06